# Patient Record
Sex: MALE | Race: WHITE | NOT HISPANIC OR LATINO | Employment: FULL TIME | URBAN - METROPOLITAN AREA
[De-identification: names, ages, dates, MRNs, and addresses within clinical notes are randomized per-mention and may not be internally consistent; named-entity substitution may affect disease eponyms.]

---

## 2017-01-18 ENCOUNTER — HOSPITAL ENCOUNTER (EMERGENCY)
Facility: HOSPITAL | Age: 32
Discharge: LEFT BEFORE DISCHARGE | End: 2017-01-18
Attending: EMERGENCY MEDICINE

## 2017-01-18 VITALS
TEMPERATURE: 98 F | WEIGHT: 158 LBS | SYSTOLIC BLOOD PRESSURE: 141 MMHG | RESPIRATION RATE: 18 BRPM | DIASTOLIC BLOOD PRESSURE: 84 MMHG | OXYGEN SATURATION: 98 % | HEIGHT: 71 IN | HEART RATE: 108 BPM | BODY MASS INDEX: 22.12 KG/M2

## 2017-01-18 DIAGNOSIS — R41.82 ALTERED MENTAL STATUS, UNSPECIFIED ALTERED MENTAL STATUS TYPE: Primary | ICD-10-CM

## 2017-01-18 PROCEDURE — 99283 EMERGENCY DEPT VISIT LOW MDM: CPT

## 2017-01-18 NOTE — ED PROVIDER NOTES
Encounter Date: 1/18/2017       History     Chief Complaint   Patient presents with    Drug Overdose     Pt initially found unresponsive by fire departement.  Pt was bagged two times by fire departement and became responsive.  Pinpoint pupils.  Ambulatory once EMS was on scene.  Per witness on scene, pt used IV heroin.     Review of patient's allergies indicates:  No Known Allergies  HPI Comments: Chief complaint: Altered mental status    History of present illness: 31-year-old male presents the emergency department after unresponsive episode.  Per EMS, arrived at the scene of the house where this patient another patient was unresponsive.  When patient was in respiratory and cardiac arrest and was brought here.  This patient required to bag valve ventilations and then woke up.  The patient currently denies having used any sedating medications or recreational drugs today.  No other complaints at this time.     No past medical history on file.  Past Medical History Pertinent Negatives   Diagnosis Date Noted    Asthma 7/27/2014    Depression 7/27/2014    Diabetes mellitus 7/27/2014    GERD (gastroesophageal reflux disease) 7/27/2014    Hypertension 7/27/2014     Past Surgical History   Procedure Laterality Date    Hernia repair       Family History   Problem Relation Age of Onset    No Known Problems Mother     No Known Problems Father      Social History   Substance Use Topics    Smoking status: Former Smoker    Smokeless tobacco: Never Used    Alcohol use Yes     Review of Systems   Constitutional: Negative for diaphoresis, fatigue and fever.   HENT: Negative for congestion.    Eyes: Negative for photophobia and visual disturbance.   Respiratory: Negative for cough, chest tightness, shortness of breath and wheezing.    Cardiovascular: Negative for chest pain and palpitations.   Gastrointestinal: Negative for abdominal distention, abdominal pain, constipation, diarrhea, nausea and vomiting.    Genitourinary: Negative for flank pain and frequency.   Musculoskeletal: Negative for arthralgias and myalgias.   Skin: Negative for color change, pallor, rash and wound.   Neurological: Negative for dizziness, weakness, light-headedness, numbness and headaches.   Psychiatric/Behavioral: Negative for agitation and behavioral problems.       Physical Exam   Initial Vitals   BP Pulse Resp Temp SpO2   01/18/17 1340 01/18/17 1340 01/18/17 1340 01/18/17 1340 01/18/17 1340   141/84 108 18 97.8 °F (36.6 °C) 98 %     Physical Exam    Constitutional: He appears well-developed and well-nourished. He is not diaphoretic. No distress.   HENT:   Head: Normocephalic.   Right Ear: External ear normal.   Left Ear: External ear normal.   Nose: Nose normal.   Mouth/Throat: Oropharynx is clear and moist.   Eyes: Conjunctivae and EOM are normal. Pupils are equal, round, and reactive to light. Right eye exhibits no discharge. Left eye exhibits no discharge. No scleral icterus.   Neck: Normal range of motion. Neck supple. No JVD present.   Cardiovascular: Normal rate, regular rhythm, normal heart sounds and intact distal pulses. Exam reveals no gallop and no friction rub.    No murmur heard.  Pulmonary/Chest: Breath sounds normal. No stridor. No respiratory distress. He has no wheezes. He has no rhonchi. He has no rales. He exhibits no tenderness.   Abdominal: He exhibits no distension and no mass. There is no tenderness. There is no rebound and no guarding.   Musculoskeletal: Normal range of motion. He exhibits no edema or tenderness.   Neurological: He is alert and oriented to person, place, and time. He has normal strength. No cranial nerve deficit or sensory deficit.   Skin: Skin is warm and dry. No rash noted. No erythema. No pallor.   Psychiatric: He has a normal mood and affect. His behavior is normal. Judgment and thought content normal.         ED Course   Procedures  Labs Reviewed - No data to display          Medical Decision  Making:   ED Management:  This is the emergent evaluation of a 31-year-old male resents emergency department after possible drug overdose.  Patient was in a house with another patient who was apneic and pulseless.  EMS attempted to wake up the patient and the patient does not wake up and therefore received 2 bag-valve-mask ventilations.  He then woke up with this stimulus.  Patient denies using any drugs today.  Given the above, and evaluation, workup, and observation.  Would be appropriate in the emergency department.  I explained this to the patient.  However, he is alert and oriented.  He is not confused.  He has a capacity to make the decision to leave which he would like to do now.  I cannot hold him against his will.  I believe he understands his current condition and my concerns.  I reviewed the AGAINST MEDICAL ADVICE form with him and advised him to return if he changes his mind and wants to be observed.  Risks of leaving include cardiac arrest, respiratory arrest, severe disability.                   ED Course     Clinical Impression:   Altered mental status          Yaron Chavez Jr., MD  01/18/17 7934

## 2017-01-18 NOTE — NURSING
ED PROVIDER HAS PROVIDED PT W/ AMA COUNSELING. PT STILL WISHES TO LEAVE AMA. PT REFUSES TO ANSWER INTAKE QUESTIONS AND DOES NOT WISH TO WAIT FOR DISCHARGE PAPERS. PT ADVISED TO CALL 911 OR RETURN TO ED IF HE EXPERIENCES ANY SOB, DIZZINESS, CP, OR OTHER SX THAT CONCERN HIM. PT VERBALIZES UNDERSTANDING. IV REMOVED.

## 2017-01-18 NOTE — ED NOTES
Requesting to go home, reports he was sleeping when awaken by mask on his face, reports last time he used Heroin yesterday morning, advise to please wait for md evaluation, to please remain calm, requesting water, per Dr. Barreto pt can have water, cup of  Water provided

## 2017-01-18 NOTE — ED TRIAGE NOTES
"PT TO ED 4 VIA EMS. PER EMS PT WAS UNRESPONSIVE WITH ANOTHER OD PATIENT AND WOKE UP WHILE BEING BAGGED W/ BVM. PT DENIES ANY DRUG USE AND STATES, "I WAS NATURALLY SLEEPING, I DON'T WANT TO STAY HERE." PT FULLY A/A/O, ED PROVIDER AWARE OF PT'S CC AND CURRENT STATE/WISHES.   "

## 2017-04-14 ENCOUNTER — HOSPITAL ENCOUNTER (EMERGENCY)
Facility: OTHER | Age: 32
Discharge: HOME | End: 2017-04-14
Attending: EMERGENCY MEDICINE

## 2017-04-14 VITALS
HEART RATE: 61 BPM | DIASTOLIC BLOOD PRESSURE: 76 MMHG | TEMPERATURE: 99 F | OXYGEN SATURATION: 100 % | SYSTOLIC BLOOD PRESSURE: 153 MMHG | WEIGHT: 180 LBS | HEIGHT: 71 IN | BODY MASS INDEX: 25.2 KG/M2 | RESPIRATION RATE: 18 BRPM

## 2017-04-14 DIAGNOSIS — Z48.02 ENCOUNTER FOR REMOVAL OF SUTURES: Primary | ICD-10-CM

## 2017-04-14 PROCEDURE — 99281 EMR DPT VST MAYX REQ PHY/QHP: CPT

## 2017-04-14 NOTE — DISCHARGE INSTRUCTIONS
"  Suture or Staple Removal  You were seen today for a suture or staple removal. Your wound is healing as expected. The wound has healed well enough that the sutures or staples can be removed. The wound will continue to heal for the next few months.  At this time there is no sign of infection.   Home care  · If you have pain, take pain medicine as advised by your healthcare provider.   · Keep your wound clean and protected by covering it with a bandage for the next week or so.   · Wash your hands with soap and warm water before and after caring for your wound. This helps prevent infection.  · Clean the wound gently with soap and warm water daily or as directed by your childs health care provider. Do not use iodine, alcohol, or other cleansers on the wound.  Gently pat it dry. Put on a new bandage, if needed. Do not reuse bandages.  · If the area gets wet, gently pat it dry with a clean cloth. Replace the wet bandage with a dry one.  · Check the wound daily for signs of infection. (These are listed under "When to seek medical advice" below.)  · You may shower and bathe as usual. Swimming is now permitted.  Follow-up care  Follow up with your healthcare provider as advised.  When to seek medical advice   Call your healthcare provider if any of the following occur:  · Wound reopens or bleeds  · Signs of an infection, such as:  ¨ Increasing redness or swelling around the wound  ¨ Increased warmth from the wound  ¨ Worsening pain  ¨ Red streaking lines away from the wound  ¨ Fluid draining from the wound  · Fever of 100.4°F (38°C) or higher, or as directed by your child's healthcare provider  Date Last Reviewed: 9/27/2015  © 2068-0538 [employer]. [address] [place] [address], [place] [address]. All rights reserved. This information is not intended as a substitute for professional medical care. Always follow your healthcare professional's instructions.        "

## 2017-04-14 NOTE — ED AVS SNAPSHOT
[facility]  [address]  [facility] [address]               Kevin MARIE  [place]   2017 10:48 AM   ED    Description:  Male : 1985   Department:  [facility], [place], operated [facility]           Your Care was Coordinated By:     Provider Role From To    Quiana Wallace MD Attending Provider 17 1051 --      Reason for Visit     Suture / Staple Removal           Diagnoses this Visit        Comments    Encounter for removal of sutures    -  Primary       ED Disposition     None           To Do List           Follow-up Information     Schedule an appointment as soon as possible for a visit with Your primary care doctor.    Why:  As needed      [facility]     [facility] Care [facility] -  Assistance  Unless otherwise directed by your provider, please contact [facility], our nurse care line that is available for  assistance.     Registered nurses in the [facility] provide: appointment scheduling, clinical advisement, health education, and other advisory services.  Call: [phone] (toll free)               Medications           Message regarding Medications     Verify the changes and/or additions to your medication regime listed below are the same as discussed with your clinician today.  If any of these changes or additions are incorrect, please notify your healthcare provider.             Verify that the below list of medications is an accurate representation of the medications you are currently taking.  If none reported, the list may be blank. If incorrect, please contact your healthcare provider. Carry this list with you in case of emergency.           Current Medications     acetaminophen (TYLENOL) 325 MG tablet Take 325 mg by mouth every 6 (six) hours as needed for Pain.    fish oil-omega-3 fatty acids 300-1,000 mg capsule Take 2 g by mouth once daily.    ibuprofen (ADVIL,MOTRIN) 800 MG tablet Take 1 tablet (800 mg total) by  "mouth every 6 (six) hours as needed for Pain.    multivitamin capsule Take 1 capsule by mouth once daily.           Clinical Reference Information           Your Vitals Were     BP Pulse Temp Resp Height Weight    153/76 (BP Location: Left arm, Patient Position: Sitting) 61 98.8 °F (37.1 °C) (Oral) 18 5' 11" (1.803 m) 81.6 kg (180 lb)    SpO2 BMI             100% 25.1 kg/m2         Allergies as of 4/14/2017     No Known Allergies      Immunizations Administered on Date of Encounter - 4/14/2017     None      ED Micro, Lab, POCT     None      ED Imaging Orders     None        Discharge Instructions         Suture or Staple Removal  You were seen today for a suture or staple removal. Your wound is healing as expected. The wound has healed well enough that the sutures or staples can be removed. The wound will continue to heal for the next few months.  At this time there is no sign of infection.   Home care  · If you have pain, take pain medicine as advised by your healthcare provider.   · Keep your wound clean and protected by covering it with a bandage for the next week or so.   · Wash your hands with soap and warm water before and after caring for your wound. This helps prevent infection.  · Clean the wound gently with soap and warm water daily or as directed by your childs health care provider. Do not use iodine, alcohol, or other cleansers on the wound.  Gently pat it dry. Put on a new bandage, if needed. Do not reuse bandages.  · If the area gets wet, gently pat it dry with a clean cloth. Replace the wet bandage with a dry one.  · Check the wound daily for signs of infection. (These are listed under "When to seek medical advice" below.)  · You may shower and bathe as usual. Swimming is now permitted.  Follow-up care  Follow up with your healthcare provider as advised.  When to seek medical advice   Call your healthcare provider if any of the following occur:  · Wound reopens or bleeds  · Signs of an infection, " such as:  ¨ Increasing redness or swelling around the wound  ¨ Increased warmth from the wound  ¨ Worsening pain  ¨ Red streaking lines away from the wound  ¨ Fluid draining from the wound  · Fever of 100.4°F (38°C) or higher, or as directed by your child's healthcare provider  Date Last Reviewed: 9/27/2015  © 9193-3105 [employer]. [address] [place] [address], [place] [address]. All rights reserved. This information is not intended as a substitute for professional medical care. Always follow your healthcare professional's instructions.          Smoking Cessation     If you would like to quit smoking:   You may be eligible for free services if you are a [place] resident and started smoking cigarettes before September 1, 1988.  Call the Smoking Cessation Trust ([facility]) toll free at (449) [phone] or (216) 550-3016.   Call 1-800-QUIT-NOW if you do not meet the above criteria.   Contact us via email: [email or web address]   View our website for more information: [email or web address]         [facility] complies with applicable Federal civil rights laws and does not discriminate on the basis of race, color, national origin, age, disability, or sex.        Language Assistance Services     ATTENTION: Language assistance services are available, free of charge. Please call [phone].      ATENCIÓN: Si habla español, tiene a stein disposición servicios gratuitos de asistencia lingüística. Llame al [phone].     CHÚ Ý: N?u b?n nói Ti?ng Vi?t, có các d?ch v? h? tr? ngôn ng? mi?n phí dành cho b?n. G?i s? [phone].

## 2017-06-26 ENCOUNTER — HOSPITAL ENCOUNTER (EMERGENCY)
Facility: HOSPITAL | Age: 32
Discharge: HOME | End: 2017-06-26

## 2017-06-26 VITALS
HEIGHT: 71 IN | DIASTOLIC BLOOD PRESSURE: 78 MMHG | WEIGHT: 180 LBS | OXYGEN SATURATION: 98 % | SYSTOLIC BLOOD PRESSURE: 119 MMHG | RESPIRATION RATE: 20 BRPM | BODY MASS INDEX: 25.2 KG/M2 | HEART RATE: 52 BPM | TEMPERATURE: 98 F

## 2017-06-26 DIAGNOSIS — H60.02 ABSCESS OF LEFT EXTERNAL EAR: Primary | ICD-10-CM

## 2017-06-26 PROCEDURE — 99283 EMERGENCY DEPT VISIT LOW MDM: CPT | Mod: 25

## 2017-06-26 PROCEDURE — 10060 I&D ABSCESS SIMPLE/SINGLE: CPT

## 2017-06-26 PROCEDURE — 25000003 PHARM REV CODE 250: Performed by: NURSE PRACTITIONER

## 2017-06-26 PROCEDURE — 25000003 PHARM REV CODE 250: Performed by: PHYSICIAN ASSISTANT

## 2017-06-26 RX ORDER — SULFAMETHOXAZOLE AND TRIMETHOPRIM 800; 160 MG/1; MG/1
1 TABLET ORAL
Status: COMPLETED | OUTPATIENT
Start: 2017-06-26 | End: 2017-06-26

## 2017-06-26 RX ORDER — TRAMADOL HYDROCHLORIDE 50 MG/1
50 TABLET ORAL EVERY 6 HOURS PRN
Qty: 8 TABLET | Refills: 0 | Status: SHIPPED | OUTPATIENT
Start: 2017-06-26 | End: 2022-02-14

## 2017-06-26 RX ORDER — LIDOCAINE HYDROCHLORIDE 10 MG/ML
10 INJECTION INFILTRATION; PERINEURAL
Status: COMPLETED | OUTPATIENT
Start: 2017-06-26 | End: 2017-06-26

## 2017-06-26 RX ORDER — SULFAMETHOXAZOLE AND TRIMETHOPRIM 800; 160 MG/1; MG/1
1 TABLET ORAL EVERY 12 HOURS
Qty: 14 TABLET | Refills: 0 | Status: SHIPPED | OUTPATIENT
Start: 2017-06-26 | End: 2017-07-03

## 2017-06-26 RX ADMIN — SULFAMETHOXAZOLE AND TRIMETHOPRIM 1 TABLET: 800; 160 TABLET ORAL at 10:06

## 2017-06-26 RX ADMIN — LIDOCAINE HYDROCHLORIDE 10 ML: 10 INJECTION, SOLUTION INFILTRATION; PERINEURAL at 10:06

## 2017-06-26 NOTE — ED PROVIDER NOTES
"Encounter Date: 6/26/2017       History     Chief Complaint   Patient presents with    Facial Swelling     left ear swelling x 1 wk; denies any trauma    Other     Historian: Patient  Chief complaint: Ear swelling  History of present illness: This 32 year old male presents to the ED complaining of a 10 day history of swelling with associated pain to his left external ear.  His pain is described as "pressure" and aggravated by touch.  The patient denies fever and ear drainage.  His tetanus immunization is up to date.          Review of patient's allergies indicates:  No Known Allergies  History reviewed. No pertinent past medical history.  Past Surgical History:   Procedure Laterality Date    HERNIA REPAIR       Family History   Problem Relation Age of Onset    No Known Problems Mother     No Known Problems Father      Social History   Substance Use Topics    Smoking status: Current Every Day Smoker    Smokeless tobacco: Never Used    Alcohol use Yes     Review of Systems   Constitutional: Negative for fever.   HENT:        +Left ear swelling.   Musculoskeletal: Negative for gait problem.   Skin: Negative for color change.   Allergic/Immunologic: Negative for immunocompromised state.   Neurological: Negative for seizures.   Psychiatric/Behavioral: Negative for confusion.       Physical Exam     Initial Vitals [06/26/17 0922]   BP Pulse Resp Temp SpO2   119/78 (!) 52 20 98 °F (36.7 °C) 98 %      MAP       91.67         Physical Exam    Constitutional: He appears well-developed and well-nourished. He is cooperative.  Non-toxic appearance. No distress.   HENT:   Head: Normocephalic and atraumatic.   Ears:    Mouth/Throat: Mucous membranes are normal. No trismus in the jaw.   Neck: Trachea normal, normal range of motion, full passive range of motion without pain and phonation normal. Neck supple. No stridor present. No neck rigidity.   Cardiovascular: Regular rhythm and normal heart sounds. Bradycardia present.  " Exam reveals no gallop.    Pulmonary/Chest: Effort normal and breath sounds normal. No tachypnea. No respiratory distress. He has no decreased breath sounds. He has no wheezes. He has no rhonchi. He has no rales.   Neurological: He is alert and oriented to person, place, and time. He has normal strength. No sensory deficit.   Skin: Skin is warm, dry and intact. No erythema.         ED Course   I & D - Incision and Drainage  Date/Time: 2017 10:44 AM  Performed by: RAHUL CHACON  Authorized by: KALYN RAMAN   Consent Done: Yes  Consent: Verbal consent obtained.  Risks and benefits: risks, benefits and alternatives were discussed  Consent given by: patient  Patient identity confirmed:  and name  Type: abscess  Location: left external ear.  Anesthesia: local infiltration    Anesthesia:  Local Anesthetic: lidocaine 1% without epinephrine  Anesthetic total: 0.5 mL  Patient sedated: no  Scalpel size: 11  Incision type: single straight  Complexity: simple  Drainage characteristics: +cloudy, serosanguinous.  Drainage amount: moderate  Wound treatment: incision and  wound left open  Complications: No  Patient tolerance: Patient tolerated the procedure well with no immediate complications        Labs Reviewed - No data to display             Additional MDM:   Comments: Patient with a 10 day history of left external ear swelling.  He is afebrile and nontoxic appearing.  He has a 3 cm area of swelling, fluctuance, and tenderness to palpation to his left auricle.  This was incised and drained as per the procedure note.  As the drainage was cloudy and serosanguinous, will treat with antibiotics - first dose given in the ED.  His tetanus immunization is up to date.  He will be discharged to home with supportive care and Ultram for pain.  He is to return to the ED in 2 days for wound check.  Careful ED warnings and return instructions given.  This patient's case was discussed with Dr. Raman, who also evaluated  the patient - he is in agreement with the assessment and plan..            Attending Attestation:     Physician Attestation Statement for NP/PA:   I have conducted a face to face encounter with this patient in addition to the NP/PA, due to Medical Complexity    Other NP/PA Attestation Additions:    History of Present Illness: No previous history of vitals are abscesses,   Physical Exam: 1.5 x 1.0 cm abscess left ear                  ED Course     Clinical Impression:   The encounter diagnosis was Abscess of left external ear.                           TRINY Martinez  06/26/17 1140

## 2017-06-26 NOTE — DISCHARGE INSTRUCTIONS
The patient is discharged to home.  You are to follow up as directed above.  Keep wound clean and dry.  Change dressing at least twice a day or more if needed.  Return to the ED for any new or worsening symptoms: fever, increased pain/swelling, or any other concerns.

## 2017-06-28 ENCOUNTER — HOSPITAL ENCOUNTER (EMERGENCY)
Facility: HOSPITAL | Age: 32
Discharge: HOME | End: 2017-06-28
Attending: EMERGENCY MEDICINE

## 2017-06-28 VITALS
RESPIRATION RATE: 18 BRPM | BODY MASS INDEX: 25.2 KG/M2 | TEMPERATURE: 99 F | WEIGHT: 180 LBS | SYSTOLIC BLOOD PRESSURE: 134 MMHG | DIASTOLIC BLOOD PRESSURE: 78 MMHG | OXYGEN SATURATION: 97 % | HEART RATE: 60 BPM | HEIGHT: 71 IN

## 2017-06-28 DIAGNOSIS — S00.432A EAR HEMATOMA, LEFT, INITIAL ENCOUNTER: Primary | ICD-10-CM

## 2017-06-28 PROCEDURE — 25000003 PHARM REV CODE 250: Performed by: NURSE PRACTITIONER

## 2017-06-28 PROCEDURE — 10060 I&D ABSCESS SIMPLE/SINGLE: CPT

## 2017-06-28 PROCEDURE — 99283 EMERGENCY DEPT VISIT LOW MDM: CPT | Mod: 25

## 2017-06-28 PROCEDURE — 69000 DRG XTRNL EAR ABSC/HEM SMPL: CPT | Mod: LT

## 2017-06-28 RX ORDER — LIDOCAINE HYDROCHLORIDE 10 MG/ML
10 INJECTION INFILTRATION; PERINEURAL
Status: COMPLETED | OUTPATIENT
Start: 2017-06-28 | End: 2017-06-28

## 2017-06-28 RX ADMIN — LIDOCAINE HYDROCHLORIDE 10 ML: 10 INJECTION, SOLUTION INFILTRATION; PERINEURAL at 08:06

## 2017-06-28 NOTE — DISCHARGE INSTRUCTIONS
Please try to keep the pressure dressing over the ear for as long as possible to prevent it from re-filling with blood.    Keep taking the bactrim until the prescription has finished.    You can take Ibuprofen for pain and inflammation.  Take the tramadol that was previously prescribed for breakthrough pain, as needed.    Follow up with ENT for further evaluation of the ear, if any persisting problems.    Return to the ER for any new or worsening symptoms or concerns.

## 2017-06-28 NOTE — ED TRIAGE NOTES
Here 2 days ago with abscess to lt. Ear. Here today c/o  Increased swelling to ear. Also needs work note to RTW.

## 2017-06-28 NOTE — ED PROVIDER NOTES
"Encounter Date: 6/28/2017    SCRIBE #1 NOTE: I, Morales Shea, am scribing for, and in the presence of,  Priya Robison NP. I have scribed the following portions of the note - Other sections scribed: HPI and ROS.       History     Chief Complaint   Patient presents with    Abscess     Pt reports he was here on 6/26 for left pinnea abscess and they I & D'd the ear. Today "it is swollen and red again and I thinks it may need to be popped again." No fevers reported.     Chief Complaint: Abscess    HPI: This 32 y.o. Male with no known PMHx presents to the ED c/o an abscess to the left ear. Patient received an I&D 2 days ago and told to return to this ED if symptoms worsened. Patient states pain is severe and has worsened since onset. He also states the wound has since "refilled". He also compliant with Bactrim treatment. He denies fever, chills, nausea, vomiting or hearing decrease.       The history is provided by the patient. No  was used.     Review of patient's allergies indicates:  No Known Allergies  History reviewed. No pertinent past medical history.  Past Surgical History:   Procedure Laterality Date    HERNIA REPAIR       Family History   Problem Relation Age of Onset    No Known Problems Mother     No Known Problems Father      Social History   Substance Use Topics    Smoking status: Current Every Day Smoker    Smokeless tobacco: Never Used    Alcohol use Yes     Review of Systems   Constitutional: Negative for chills and fever.   HENT: Negative for ear pain.    Respiratory: Negative for cough.    Gastrointestinal: Negative for nausea and vomiting.   Skin: Positive for wound.   Neurological: Negative for headaches.       Physical Exam     Initial Vitals [06/28/17 0731]   BP Pulse Resp Temp SpO2   126/70 74 18 98.1 °F (36.7 °C) 98 %      MAP       88.67         Physical Exam    Nursing note and vitals reviewed.  Constitutional: Vital signs are normal. He appears well-developed and " well-nourished. He is not diaphoretic. He is active and cooperative.  Non-toxic appearance. He does not appear ill. No distress.   HENT:   Right Ear: Tympanic membrane, external ear and ear canal normal.   Left Ear: Tympanic membrane and ear canal normal.   Ears:    Neurological: He is alert and oriented to person, place, and time.   Skin: Skin is warm and dry. Capillary refill takes less than 2 seconds.         ED Course   I & D - Incision and Drainage  Date/Time: 6/28/2017 8:45 AM  Performed by: FRANKIE PICKENS  Authorized by: BENOIT AGUILAR JR   Type: cyst  Body area: head/neck  Location details: left external ear  Anesthesia: local infiltration    Anesthesia:  Local Anesthetic: lidocaine 1% without epinephrine  Anesthetic total: 1 mL  Scalpel size: 11  Incision type: single straight  Complexity: simple  Drainage: bloody  Drainage amount: scant  Wound treatment: incision and  drainage  Packing material: none  Complications: No  Specimens: No  Implants: No  Patient tolerance: Patient tolerated the procedure well with no immediate complications        Labs Reviewed - No data to display             Additional MDM:   Comments: This is an urgent evaluation of a 32-year-old male that presents the emergency room complaining of left otalgia.  He was seen in emergency room 2 days ago for an abscess to the left ear that was incised and drained.  He was placed on Bactrim, but reports that shortly after taking off the pressure bandage that was applied and the emergency room, the wound began to refill and has become painful again.  He denies any other symptoms; no diminished hearing, and inner pain, ear drainage, fevers, body aches.  A stab incision was performed and a small amount of blood drained from the wound.  There was no purulence.  This is essentially an auricular hematoma.  I recommended the patient continue taking Bactrim until was completed.  A pressure dressing was placed over the ear and I advised the patient to  wear the dressing as long as possible today.  ENT referral was given for any persisting problems.  Return precautions given for any new or worsening symptoms or concerns.  Case discussed with attending, , and he is in agreement with plan. Stable for discharge and outpatient follow.  .          Scribe Attestation:   Scribe #1: I performed the above scribed service and the documentation accurately describes the services I performed. I attest to the accuracy of the note.    Attending Attestation:     Physician Attestation Statement for NP/PA:   I discussed this assessment and plan of this patient with the NP/PA, but I did not personally examine the patient. The face to face encounter was performed by the NP/PA.    Other NP/PA Attestation Additions:      Medical Decision Making: This is the emergent evaluation of a 32-year-old male presents the emergency department for reevaluation of abscess to the left ear.  Patient was seen at an I and D done.  Is placed on Bactrim.  He is taking this.I agree with the treatment plan and evaluation as outlined by the midlevel provider.  Abscess was reopened for possible further drainage by mid-level provider.  This revealed only blood no evidence of purulent drainage.  There is no evidence of surrounding cellulitis.  Patient to continue on Bactrim and to wear pressure bandage which he was not wearing.  He also will be referred to your nose and throat if symptoms do not improve.  He was advised return for new or worsening symptoms such as worsening swelling, fever, headaches.       Physician Attestation for Scribe:  Physician Attestation Statement for Scribe #1: I, Priya Robison NP, reviewed documentation, as scribed by Morales Shea in my presence, and it is both accurate and complete.                 ED Course     Clinical Impression:   The encounter diagnosis was Ear hematoma, left, initial encounter.    Disposition:   Disposition: Discharged  Condition:  Stable                        rPiya Robison, [place]  06/28/17 8886

## 2018-01-15 ENCOUNTER — HOSPITAL ENCOUNTER (EMERGENCY)
Facility: OTHER | Age: 33
Discharge: HOME | End: 2018-01-15
Attending: EMERGENCY MEDICINE

## 2018-01-15 VITALS
WEIGHT: 180 LBS | OXYGEN SATURATION: 100 % | RESPIRATION RATE: 16 BRPM | TEMPERATURE: 98 F | BODY MASS INDEX: 24.38 KG/M2 | DIASTOLIC BLOOD PRESSURE: 64 MMHG | HEART RATE: 62 BPM | HEIGHT: 72 IN | SYSTOLIC BLOOD PRESSURE: 122 MMHG

## 2018-01-15 DIAGNOSIS — S97.81XA: Primary | ICD-10-CM

## 2018-01-15 DIAGNOSIS — S99.921A RIGHT FOOT INJURY: ICD-10-CM

## 2018-01-15 PROCEDURE — 99283 EMERGENCY DEPT VISIT LOW MDM: CPT

## 2018-01-15 RX ORDER — IBUPROFEN 800 MG/1
800 TABLET ORAL 3 TIMES DAILY
COMMUNITY
End: 2022-11-26

## 2018-01-15 NOTE — ED TRIAGE NOTES
""heavy metal eye beam fell onto my right foot yesterday". C/o pain to dorsal surface of r fight. + edema. No visible deformity. Took motrin this am   "

## 2018-01-31 NOTE — ED PROVIDER NOTES
Encounter Date: 1/15/2018       History     Chief Complaint   Patient presents with    Foot Injury     Mr Lindo reports pain to top of R foot. Reports accidental injury yesterday when I beam fell on top of foot. Went to work today and reports was told to get foot evaluated when noticed he was limping. Reports pain worsens w ambulation/bearing weight. No ankle pain or other leg pain. No tingling or numbness. Was wearing shoes. Reports mild to mod pain to top of R foot. Mild relief w otc nsaids.       The history is provided by the patient.     Review of patient's allergies indicates:  No Known Allergies  Past Medical History:   Diagnosis Date    H/O hernia repair      Past Surgical History:   Procedure Laterality Date    HERNIA REPAIR       Family History   Problem Relation Age of Onset    No Known Problems Mother     No Known Problems Father      Social History   Substance Use Topics    Smoking status: Current Every Day Smoker    Smokeless tobacco: Never Used    Alcohol use Yes     Review of Systems   Musculoskeletal:        Pain to top of R foot, injury   All other systems reviewed and are negative.      Physical Exam     Initial Vitals [01/15/18 1531]   BP Pulse Resp Temp SpO2   130/76 66 15 98 °F (36.7 °C) 99 %      MAP       94         Physical Exam    Nursing note and vitals reviewed.  Constitutional: He appears well-developed and well-nourished. He is not diaphoretic. No distress.   HENT:   Head: Normocephalic and atraumatic.   Neck: Normal range of motion.   Pulmonary/Chest: No respiratory distress.   Musculoskeletal:   Mild Localized swelling to dorsum of R mid foot w no deformity or perfusion problesm. Normal rom of toes and ankle. Normal 2+dp pulse.    Neurological: He is alert and oriented to person, place, and time. No cranial nerve deficit or sensory deficit.   Skin: Skin is warm and dry. Capillary refill takes less than 2 seconds. No rash noted. No erythema.   Psychiatric: He has a normal  mood and affect. His behavior is normal. Thought content normal.         ED Course   Procedures  Labs Reviewed - No data to display          Medical Decision Making:   Clinical Tests:   Radiological Study: Ordered and Reviewed  ED Management:  Foot xray negative for fx but explained that underlying fx not seen may be possible. Discussed need to have repeat xrays in a few days if pain persists and discussed worrisome signs that should prompt need to return to er should they occur. Walking boot given after discussion. There is no indication for further emergent intervention or evaluation at this time.                      ED Course      Clinical Impression:   The primary encounter diagnosis was Crush injury to foot, right, initial encounter. A diagnosis of Right foot injury was also pertinent to this visit.                           Kadeem Freitas MD  01/31/18 0013       Kadeem Freitas MD  01/31/18 0014

## 2019-06-06 ENCOUNTER — HOSPITAL ENCOUNTER (EMERGENCY)
Facility: HOSPITAL | Age: 34
Discharge: HOME | End: 2019-06-06
Attending: EMERGENCY MEDICINE

## 2019-06-06 VITALS
SYSTOLIC BLOOD PRESSURE: 148 MMHG | OXYGEN SATURATION: 97 % | HEART RATE: 72 BPM | TEMPERATURE: 98 F | WEIGHT: 185 LBS | BODY MASS INDEX: 22.53 KG/M2 | DIASTOLIC BLOOD PRESSURE: 97 MMHG | HEIGHT: 76 IN | RESPIRATION RATE: 18 BRPM

## 2019-06-06 DIAGNOSIS — H18.891 CORNEAL RUST RING OF RIGHT EYE: Primary | ICD-10-CM

## 2019-06-06 DIAGNOSIS — T15.91XA FOREIGN BODY OF RIGHT EYE, INITIAL ENCOUNTER: ICD-10-CM

## 2019-06-06 PROCEDURE — 90471 IMMUNIZATION ADMIN: CPT | Performed by: PHYSICIAN ASSISTANT

## 2019-06-06 PROCEDURE — 25000003 PHARM REV CODE 250: Performed by: PHYSICIAN ASSISTANT

## 2019-06-06 PROCEDURE — 90715 TDAP VACCINE 7 YRS/> IM: CPT | Performed by: PHYSICIAN ASSISTANT

## 2019-06-06 PROCEDURE — 99284 EMERGENCY DEPT VISIT MOD MDM: CPT

## 2019-06-06 PROCEDURE — 99284 PR EMERGENCY DEPT VISIT,LEVEL IV: ICD-10-PCS | Mod: ,,, | Performed by: PHYSICIAN ASSISTANT

## 2019-06-06 PROCEDURE — 99284 EMERGENCY DEPT VISIT MOD MDM: CPT | Mod: ,,, | Performed by: PHYSICIAN ASSISTANT

## 2019-06-06 PROCEDURE — 63600175 PHARM REV CODE 636 W HCPCS: Performed by: PHYSICIAN ASSISTANT

## 2019-06-06 RX ORDER — IBUPROFEN 400 MG/1
800 TABLET ORAL
Status: COMPLETED | OUTPATIENT
Start: 2019-06-06 | End: 2019-06-06

## 2019-06-06 RX ORDER — MOXIFLOXACIN 5 MG/ML
1 SOLUTION/ DROPS OPHTHALMIC EVERY 6 HOURS
Qty: 1.3334 ML | Refills: 0 | Status: SHIPPED | OUTPATIENT
Start: 2019-06-06 | End: 2019-06-11

## 2019-06-06 RX ORDER — ERYTHROMYCIN 5 MG/G
OINTMENT OPHTHALMIC
Qty: 1 TUBE | Refills: 0 | Status: SHIPPED | OUTPATIENT
Start: 2019-06-06 | End: 2022-02-14

## 2019-06-06 RX ORDER — PROPARACAINE HYDROCHLORIDE 5 MG/ML
1 SOLUTION/ DROPS OPHTHALMIC
Status: COMPLETED | OUTPATIENT
Start: 2019-06-06 | End: 2019-06-06

## 2019-06-06 RX ADMIN — IBUPROFEN 800 MG: 400 TABLET, FILM COATED ORAL at 10:06

## 2019-06-06 RX ADMIN — PROPARACAINE HYDROCHLORIDE 1 DROP: 5 SOLUTION/ DROPS OPHTHALMIC at 08:06

## 2019-06-06 RX ADMIN — CLOSTRIDIUM TETANI TOXOID ANTIGEN (FORMALDEHYDE INACTIVATED), CORYNEBACTERIUM DIPHTHERIAE TOXOID ANTIGEN (FORMALDEHYDE INACTIVATED), BORDETELLA PERTUSSIS TOXOID ANTIGEN (GLUTARALDEHYDE INACTIVATED), BORDETELLA PERTUSSIS FILAMENTOUS HEMAGGLUTININ ANTIGEN (FORMALDEHYDE INACTIVATED), BORDETELLA PERTUSSIS PERTACTIN ANTIGEN, AND BORDETELLA PERTUSSIS FIMBRIAE 2/3 ANTIGEN 0.5 ML: 5; 2; 2.5; 5; 3; 5 INJECTION, SUSPENSION INTRAMUSCULAR at 10:06

## 2019-06-07 ENCOUNTER — OFFICE VISIT (OUTPATIENT)
Dept: OPHTHALMOLOGY | Facility: CLINIC | Age: 34
End: 2019-06-07
Payer: COMMERCIAL

## 2019-06-07 DIAGNOSIS — H20.9 IRITIS OF RIGHT EYE: ICD-10-CM

## 2019-06-07 DIAGNOSIS — S05.01XA ABRASION OF RIGHT CORNEA, INITIAL ENCOUNTER: Primary | ICD-10-CM

## 2019-06-07 PROCEDURE — 92012 PR EYE EXAM, EST PATIENT,INTERMED: ICD-10-PCS | Mod: S$GLB,,, | Performed by: OPHTHALMOLOGY

## 2019-06-07 PROCEDURE — 92012 INTRM OPH EXAM EST PATIENT: CPT | Mod: S$GLB,,, | Performed by: OPHTHALMOLOGY

## 2019-06-07 PROCEDURE — 99999 PR PBB SHADOW E&M-EST. PATIENT-LVL II: CPT | Mod: PBBFAC,,, | Performed by: OPHTHALMOLOGY

## 2019-06-07 PROCEDURE — 99999 PR PBB SHADOW E&M-EST. PATIENT-LVL II: ICD-10-PCS | Mod: PBBFAC,,, | Performed by: OPHTHALMOLOGY

## 2019-06-07 NOTE — CONSULTS
[facility]  Ophthalmology  Consult Note    Patient Name: Kevin Lindo  MRN: [id number]  Admission Date: 6/6/2019  Hospital Length of Stay: 0 days  Attending Provider: Catherine Myers MD   Primary Care Physician: Primary Doctor No  Principal Problem:<principal problem not specified>    Inpatient consult to Ophthalmology  Consult performed by: Daniel Kendall MD  Consult ordered by: Miguel Rivera PA-C        Subjective:     Chief Complaint:  Corneal foreign body right eye    HPI:    Kevin Lindo is an 34 y.o. male with no significant PMHx who presents with right eye metallic FB. 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 Progressive blurriness of vision, assoc with monocular diplopia right eye, eye pain, tearing, eyelid swelling. Denies flashes/floaters.    POHx: denies glasses, contacts, surgeries, injections, lasers, trauma, gtts     FOHx: denies ARMD. Glaucoma in grandmother.      Base Eye Exam     Visual Acuity (Snellen - Linear)       Right Left    Near sc 20/30 20/25          Tonometry (Tonopen, 10:21 PM)       Right Left    Pressure 11 12          Pupils       Pupils Dark Light Shape React APD    Right PERRL 3 2 Round Brisk None    Left PERRL 3 2 Round Brisk None          Visual Fields       Right Left     Full Full          Extraocular Movement       Right Left     Full, Ortho Full, Ortho          Dilation     Both eyes:  2.5% phenylephrine, 1% tropicamide @ 10:21 PM            Slit Lamp and Fundus Exam     External Exam       Right Left    External Normal Normal          Slit Lamp Exam       Right Left    Lids/Lashes Normal Normal    Conjunctiva/Sclera 1+ Injection White and quiet    Cornea 0.5mm rust ring at 3:00 3mm from limbus Clear    Anterior Chamber Deep and quiet Deep and quiet    Iris Round and reactive Round and reactive    Lens Clear Clear    Vitreous Normal Normal          Fundus Exam       Right Left    Disc Normal Normal     C/D Ratio 0.2 0.2    Macula Normal Normal    Vessels Normal Normal    Periphery Normal Normal              Assessment and Plan:     1. Corneal foreign body, right eye  - at 3:00 outside visual axis  - no signs of globe penetration  - 0.5mm diameter ring, removed at slit lamp with 18G needle   - Vigamox instilled before and after, instilled EES missael after  - no complications  - small residual amount of faint residual rust ring left as ring was fairly deep with initial injury occurring 3d ago    Recommendations:  - start erythromycin ointment qHS right eye  - start vigamox 6x daily right eye  - will see in ophthalmology clinic tomorrow     Pt phone: [phone]    Daniel Kendall MD  Ophthalmology  [facility]

## 2019-06-07 NOTE — ED TRIAGE NOTES
Pt states that he is a  and yesterday morning he thinks he got a piece of metal in his right eye. Pt states it has been getting worse since yesterday and now eye is swollen and irritated. Pt states that his vision in his right eye is blurry at present.

## 2019-06-07 NOTE — ED PROVIDER NOTES
Encounter Date: 6/6/2019    SCRIBE #1 NOTE: I, Kenny Encarnacion, am scribing for, and in the presence of,  Dr. Myers. I have scribed the following portions of the note - the APC attestation.       History     Chief Complaint   Patient presents with    Eye Problem     yesterday AM believes metal got in R eye at work. c/o blurry vision, tearing, pain.      34 year old male previously healthy presenting to the ED with the chief complaint of right eye pain. 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 He reports progressively worsening pain and eyelid swelling over the past 2 days. He reports associated clear discharge and blurry vision. He denies left eye involvement. He denies history of eye surgeries or eye problems. He denies contact lens and eyeglasses use.         Review of patient's allergies indicates:  No Known Allergies  Past Medical History:   Diagnosis Date    H/O hernia repair      Past Surgical History:   Procedure Laterality Date    HERNIA REPAIR       Family History   Problem Relation Age of Onset    No Known Problems Mother     No Known Problems Father      Social History     Tobacco Use    Smoking status: Former Smoker    Smokeless tobacco: Never Used   Substance Use Topics    Alcohol use: Yes    Drug use: Not Currently     Types: Marijuana     Review of Systems   Constitutional: Negative for chills, diaphoresis and fever.   HENT: Negative for sore throat and trouble swallowing.    Eyes: Positive for pain, discharge (clear), redness and visual disturbance. Negative for photophobia.   Respiratory: Negative for cough and shortness of breath.    Cardiovascular: Negative for chest pain.   Gastrointestinal: Negative for abdominal pain, diarrhea, nausea and vomiting.   Genitourinary: Negative for dysuria and hematuria.   Musculoskeletal: Negative for arthralgias, back pain, neck pain and  neck stiffness.   Skin: Negative for rash and wound.   Neurological: Negative for dizziness, light-headedness and headaches.     Physical Exam     Initial Vitals [06/06/19 1858]   BP Pulse Resp Temp SpO2   (!) 165/94 84 18 98.5 °F (36.9 °C) 97 %      MAP       --         Physical Exam    Constitutional: He appears well-developed and well-nourished. He is not diaphoretic. No distress.   HENT:   Head: Normocephalic and atraumatic.   Mouth/Throat: Oropharynx is clear and moist. No oropharyngeal exudate.   Eyes: EOM are normal. Pupils are equal, round, and reactive to light. Right eye exhibits discharge (clear).   Right eye - Mild edema to upper and lower eyelids. Conjunctiva injected. Clear discharge  Small 0.5 mm circular fluorescin    Neck: Normal range of motion. Neck supple.   Cardiovascular: Normal rate and regular rhythm.   Pulmonary/Chest: Breath sounds normal. No respiratory distress. He has no wheezes.   Abdominal: Soft. He exhibits no distension. There is no tenderness.   Musculoskeletal: Normal range of motion. He exhibits no tenderness.   Neurological: He is alert and oriented to person, place, and time.     Right eye:        ED Course   Procedures  Labs Reviewed - No data to display       Imaging Results    None          Medical Decision Making:   History:   Old Medical Records: I decided to obtain old medical records.  Other:   I have discussed this case with another health care provider.       <> Summary of the Discussion: Ophthalmology           APC / Resident Notes:   34 year old male previously healthy presenting to the ED c/o right eye pain. 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 DDx includes but not limited to foreign body presence, corneal abrasion, globe injury, conjunctivitis, uveitis. Will update tetanus.    9:16 PM  Partial removal of foreign body in R eye. Will discuss with patient with Ophthalmology and they will come see the patient.  Consult placed.     Patient evaluated by ophthalmology at bedside. They were able to remove metallic object and rust ring at bedside. Patient stable for discharge. RX for Vigamox gtts and Azithromycin ointment given. Patient will follow-up in eye clinic tomorrow. Patient expresses understanding and agreeable to the plan. Return to ED precautions given for new, worsening, or concerning symptoms. I have discussed the care of this patient with my supervising physician.        Scribe Attestation:   Scribe #1: I performed the above scribed service and the documentation accurately describes the services I performed. I attest to the accuracy of the note.    Attending Attestation:     Physician Attestation Statement for NP/PA:   I have conducted a face to face encounter with this patient in addition to the NP/PA, due to Medical Complexity    Other NP/PA Attestation Additions:    History of Present Illness: Patient with a foreign body in right eye. Assisted PA in foreign body removal. Was successful to remove metallic object. Otpthamology consulted to remove restring. Resident wants to discharge patient on Vigamox and arythromyacin oitment. Will follow up with opthamology clinic tomorrow.                       Clinical Impression:       ICD-10-CM ICD-9-CM   1. Corneal rust ring of right eye H18.891 371.89     908.5   2. Foreign body of right eye, initial encounter T15.91XA 930.9     E914         Disposition:   Disposition: Discharged  Condition: Stable                        Miguel Rivera PA-C  06/07/19 0121

## 2019-06-07 NOTE — PROGRESS NOTES
Subjective:       Patient ID: Kevin Lindo is a 34 y.o. male.    Chief Complaint: Eye Problem    HPI     DSL- 6/7/19 Dr. Kendall    35 y/o male is here for ED follow-up for FB w/rust ring OD. Pt states pain   has subsided this morning but OD is still red and  irritated. Va is   slightly blurry. Pt was given Viagmox- pt picked drops this morning.     Eyemeds  Vigamox OD     Last edited by Gaye Herring on 6/7/2019 11:00 AM. (History)             Assessment:       1. Abrasion of right cornea, initial encounter    2. Iritis of right eye        Plan:       Residual K abrasion & iritis OD s/p rust ring removal yesterday-Stable.      Start Vigamox qid OD & EES missael qhs-bid OD.  RTC me on 6/10/19 at [facility].

## 2019-06-19 ENCOUNTER — HOSPITAL ENCOUNTER (EMERGENCY)
Facility: HOSPITAL | Age: 34
Discharge: HOME | End: 2019-06-20
Attending: EMERGENCY MEDICINE

## 2019-06-19 DIAGNOSIS — T50.901A ACCIDENTAL DRUG OVERDOSE, INITIAL ENCOUNTER: Primary | ICD-10-CM

## 2019-06-19 PROCEDURE — 99282 EMERGENCY DEPT VISIT SF MDM: CPT

## 2019-06-20 VITALS
BODY MASS INDEX: 24.38 KG/M2 | SYSTOLIC BLOOD PRESSURE: 145 MMHG | WEIGHT: 180 LBS | HEIGHT: 72 IN | TEMPERATURE: 99 F | DIASTOLIC BLOOD PRESSURE: 78 MMHG | HEART RATE: 102 BPM | OXYGEN SATURATION: 98 % | RESPIRATION RATE: 16 BRPM

## 2019-06-20 NOTE — ED PROVIDER NOTES
Encounter Date: 6/19/2019    SCRIBE #1 NOTE: I, Kandijoe Scales, am scribing for, and in the presence of,  Fermin Kaur MD. I have scribed the following portions of the note - Other sections scribed: HPI, ROS, PE.       History     Chief Complaint   Patient presents with    Drug Overdose     via EMS who report [facility] received call that pt was found down and unresponsive by an onlooker by his car. Upon EMS arrival, pt awake and alert with slurred speech, pinpoint pupils. Denies any drug use. No narcan administered     34 y.o male presents to ED via EMS with c/o accidental drug overdose after taking xanax (0.25) while drinking 2.5 beers (16 oz) of andrade light 4 hours ago. Pt does not remember overdosing just waking up with EMS present. Pt denies HA, SOB, chest pain, abdominal pain, or chills. Pt reports taking multivitamin and fish oils. No PMHx. Pt denies Hx of smoking or drugs including heroin, pain killers, or IV use. Pt reports social Hx of . Pt reports drinking daily. NKDA.     The history is provided by the patient.     Review of patient's allergies indicates:  No Known Allergies  Past Medical History:   Diagnosis Date    H/O hernia repair      Past Surgical History:   Procedure Laterality Date    HERNIA REPAIR       Family History   Problem Relation Age of Onset    No Known Problems Mother     No Known Problems Father      Social History     Tobacco Use    Smoking status: Former Smoker    Smokeless tobacco: Never Used   Substance Use Topics    Alcohol use: Yes    Drug use: Not Currently     Types: Marijuana     Review of Systems   Constitutional: Negative for chills and fever.   HENT: Negative for rhinorrhea.    Eyes: Negative for discharge.   Respiratory: Negative for shortness of breath.    Cardiovascular: Negative for chest pain.   Gastrointestinal: Negative for abdominal pain.   Genitourinary: Negative for dysuria.   Musculoskeletal: Negative for neck pain.   Skin: Negative for  color change.   Neurological: Negative for headaches.   Psychiatric/Behavioral: Negative for confusion.       Physical Exam     Initial Vitals [06/19/19 2322]   BP Pulse Resp Temp SpO2   (!) 170/90 104 16 98.5 °F (36.9 °C) 98 %      MAP       --         Physical Exam    Nursing note and vitals reviewed.  Constitutional: He appears well-developed and well-nourished. He is not diaphoretic. No distress.   HENT:   Head: Normocephalic and atraumatic.   Mouth/Throat: Oropharynx is clear and moist.   Eyes: Conjunctivae and EOM are normal. Pupils are equal, round, and reactive to light. No scleral icterus.   Neck: Normal range of motion. Neck supple. No JVD present.   Cardiovascular: Normal rate, regular rhythm, normal heart sounds and intact distal pulses. Exam reveals no gallop and no friction rub.    No murmur heard.  Pulmonary/Chest: Breath sounds normal. No stridor. No respiratory distress. He has no wheezes. He has no rhonchi. He has no rales.   Abdominal: Soft. Bowel sounds are normal. He exhibits no distension. There is no tenderness. There is no rebound and no guarding.   Musculoskeletal: Normal range of motion. He exhibits no edema or tenderness.   Neurological: He is alert and oriented to person, place, and time. He has normal strength. No cranial nerve deficit or sensory deficit.   Skin: Skin is warm and dry. No rash noted.   Psychiatric: His behavior is normal. His affect is blunt (very mild).   Not somnolent or falling asleep.         ED Course   Procedures  Labs Reviewed - No data to display       Imaging Results    None          Medical Decision Making:   Initial Assessment:   34-year-old male with history of drug abuse and overdose presenting after accidental overdose.  Patient only reports drinking 2 beers and taking 0.25 mg of Xanax.  Review of chart reveals significant drug overdose after admitting to using heroin 2 years ago.  Discussed same with patient who admits he was using heroin tonight.  Patient  not somnolent, awake, alert, did not require Narcan.  He is observed in the emergency department for least 30 min without evidence of worsening somnolence.  Patient denies any suicidal intentions.  Had an in-depth grave discussion with the patient regarding his near death experience, grave prognosis, an absolute necessity to get help for drug addiction.  Patient reports he will do the same.  Patient was given resources.  Otherwise safe for discharge home with a normal exam and no further complaints.                      Clinical Impression:       ICD-10-CM ICD-9-CM   1. Accidental drug overdose, initial encounter T50.901A 977.9     E858.9         Disposition:   Disposition: Discharged  Condition: Stable         Scribe attestation: I, Fermin Kaur  , personally performed the services described in this documentation. All medical record entries made by the scribe were at my direction and in my presence.  I have reviewed the chart and agree that the record reflects my personal performance and is accurate and complete.                 Fermin Kaur MD  06/20/19 0228

## 2019-06-20 NOTE — DISCHARGE INSTRUCTIONS
You were seen in the emergency department for an overdose.  You nearly .    This is your wakeup call.  Stop using drugs and alcohol.  Please get help and find a rehabilitation program that works for you.  Please return for any new or worsening concerns, thoughts of hurting yourself or others, feelings of hopelessness, or you become concerned in any other way.

## 2019-06-20 NOTE — ED TRIAGE NOTES
"Pt presents to ER via EMS with c/o drug overdose. EMS reports [facility] received call that pt was found down and unresponsive by an onlooker by his car. Upon EMS arrival, pt awake and alert with slurred speech, pinpoint pupils. Denies any drug use to EMS. No narcan administered. Pt admits to drinking andrade lite today as well as taking "a piece" of xanax, then he has no recollection of anything otherwise. Pt AAOx4. Denies CP, SOB, N/V.   "

## 2020-04-08 ENCOUNTER — HOSPITAL ENCOUNTER (EMERGENCY)
Facility: HOSPITAL | Age: 35
Discharge: HOME | End: 2020-04-08
Attending: EMERGENCY MEDICINE
Payer: OTHER GOVERNMENT

## 2020-04-08 VITALS
RESPIRATION RATE: 20 BRPM | BODY MASS INDEX: 25.2 KG/M2 | WEIGHT: 180 LBS | HEIGHT: 71 IN | SYSTOLIC BLOOD PRESSURE: 132 MMHG | DIASTOLIC BLOOD PRESSURE: 83 MMHG | TEMPERATURE: 98 F | HEART RATE: 87 BPM | OXYGEN SATURATION: 99 %

## 2020-04-08 DIAGNOSIS — Z00.00 WELL ADULT EXAM: Primary | ICD-10-CM

## 2020-04-08 LAB — SARS-COV-2 RDRP RESP QL NAA+PROBE: NEGATIVE

## 2020-04-08 PROCEDURE — 99282 EMERGENCY DEPT VISIT SF MDM: CPT

## 2020-04-08 PROCEDURE — U0002 COVID-19 LAB TEST NON-CDC: HCPCS

## 2020-04-08 PROCEDURE — 99282 EMERGENCY DEPT VISIT SF MDM: CPT | Mod: ,,, | Performed by: EMERGENCY MEDICINE

## 2020-04-08 PROCEDURE — 99282 PR EMERGENCY DEPT VISIT,LEVEL II: ICD-10-PCS | Mod: ,,, | Performed by: EMERGENCY MEDICINE

## 2020-04-08 NOTE — ED NOTES
Pt identifiers Kevin Lindo were checked and are  correct  LOC: The patient is awake, alert, aware of environment with an appropriate affect. Oriented x4, speaking appropriately  APPEARANCE: Pt resting comfortably, in no acute distress, pt is clean and well groomed, clothing properly fastened  SKIN: Skin warm, dry and intact, normal skin turgor, moist mucus membranes  RESPIRATORY: Airway is open and patent, respirations are spontaneous, even and unlabored, normal effort and rate Breath sounds clear tad to all lung fields on auscultation  CARDIAC: Normal rate and rhythm, no peripheral edema noted, capillary refill < 3 seconds, bilateral radial pulses 2+  ABDOMEN: Soft, nontender, nondistended. Bowel sounds present to all four quad of abd on auscultation  NEUROLOGIC:  facial expression is symmetrical, patient moving all extremities spontaneously, normal sensation in all extremities when touched with a finger.  Follows all commands appropriately  MUSCULOSKELETAL: No obvious deformities.

## 2020-04-08 NOTE — ED PROVIDER NOTES
Encounter Date: 4/8/2020       History     Chief Complaint   Patient presents with    detox     Pt presents to [facility] for reported fever, Pt reports taking Tylenol around 2 PM, No URI symtpoms. last used heroin yesterday     34-year-old mal who is currently in a detox facility was sent by the [facility] because he had a temperature of 99.8°.  Patient denies any complaints.  No cough congestion runny nose or shortness of breath.  States he does not know why he was sent here.  Is no abdominal pain, urinary symptoms or any other complaints.  Last heroin use was 2 days ago.        Review of patient's allergies indicates:  No Known Allergies  Past Medical History:   Diagnosis Date    H/O hernia repair      Past Surgical History:   Procedure Laterality Date    HERNIA REPAIR       Family History   Problem Relation Age of Onset    No Known Problems Mother     No Known Problems Father      Social History     Tobacco Use    Smoking status: Former Smoker    Smokeless tobacco: Never Used   Substance Use Topics    Alcohol use: Yes    Drug use: Yes     Types: Marijuana     Comment: heroine  Last use was 4/06/2020     Review of Systems   Constitutional: Negative for activity change, appetite change, chills and fever.   HENT: Negative for congestion and sinus pressure.    Eyes: Negative for discharge.   Respiratory: Negative for cough, choking, chest tightness and shortness of breath.    Cardiovascular: Negative for chest pain.   Gastrointestinal: Negative for abdominal pain, diarrhea and vomiting.   Genitourinary: Negative for difficulty urinating and dysuria.   Musculoskeletal: Negative for arthralgias.   Skin: Negative for color change.   Neurological: Negative for dizziness and headaches.   Hematological: Does not bruise/bleed easily.       Physical Exam     Initial Vitals [04/08/20 1651]   BP Pulse Resp Temp SpO2   (!) 170/88 98 16 98.9 °F (37.2 °C) 95 %      MAP       --         Physical Exam    Nursing  note and vitals reviewed.  Constitutional: He appears well-developed and well-nourished.  Non-toxic appearance. He does not have a sickly appearance. No distress.   HENT:   Head: Normocephalic and atraumatic.   Nose: Nose normal.   Mouth/Throat: Oropharynx is clear and moist.   Eyes: Conjunctivae, EOM and lids are normal. Pupils are equal, round, and reactive to light. Right eye exhibits no nystagmus. Left eye exhibits no nystagmus.   Neck: Trachea normal, normal range of motion and phonation normal. Neck supple. No stridor present.   Cardiovascular: Normal rate, regular rhythm, normal heart sounds and normal pulses. Exam reveals no gallop and no friction rub.    No murmur heard.  Pulmonary/Chest: Breath sounds normal. No respiratory distress. He has no wheezes. He has no rhonchi. He has no rales.   Abdominal: Soft. Normal appearance and bowel sounds are normal. He exhibits no distension. There is no tenderness. There is no rigidity, no rebound, no guarding, no tenderness at McBurney's point and negative Ramires's sign.   Neurological: He is alert and oriented to person, place, and time. He has normal strength and normal reflexes. He displays normal reflexes. No cranial nerve deficit or sensory deficit. He displays a negative Romberg sign. GCS score is 15. GCS eye subscore is 4. GCS verbal subscore is 5. GCS motor subscore is 6.   Skin: Skin is warm, dry and intact. Capillary refill takes less than 2 seconds. No rash noted. No pallor.   Psychiatric: His speech is normal and behavior is normal.         ED Course   Procedures  Labs Reviewed   SARS-COV-2 RNA AMPLIFICATION, QUAL    Narrative:     Required for rehab          Imaging Results    None          Medical Decision Making:   Initial Assessment:   34-year-old male currently in a detox facility for heroin use.  He has no complaints.  Attempted to call the [facility] at [phone].  Initially spoke with the  who requested I call the nurse's station at  [phone].  However when calling this is states this note number does not work.  Attempted:  The [facility] again and it went to [facility].  The patient is afebrile here.  He has no complaints.  I am making the assumption that they are concerned of COVID-19.  I will await their phone call but will go ahead and test the patient so that I can get him discharged back to the facility for rehab.  Otherwise I do not see any further need for any other testing and I am only testing for COVID-19 due to my suspicion that this is what they want prior to receiving the patient back.                   ED Course as of Apr 08 1835 Wed Apr 08, 2020 1835 COVID-19 negative.  Will discharge patient back to [facility].    Patient discharged home in stable condition. Diagnosis and treatment plan explained to patient and/or family member who is at bedside. I have answered all questions and the patient is satisfied with the plan of care. The patient demonstrates understanding of the care plan. This is the extent to the patients complaints today here in the emergency department.    [SM]      ED Course User Index  [SM] Rambo Washington DO                Clinical Impression:     1. Well adult exam               ED Disposition Condition    Discharge Stable        ED Prescriptions     None        Follow-up Information     Follow up With Specialties Details Why Contact Info    [facility] Nai Bateman  Schedule an appointment as soon as possible for a visit  As needed [address] [facility] [address]  [phone]                                       Rambo Washington DO  04/08/20 1836

## 2020-04-08 NOTE — ED TRIAGE NOTES
Pt is staying at [facility] , had his temperature take approx. 1 hour PTA which was 99.0 and was sent to the ED

## 2020-10-05 ENCOUNTER — OFFICE VISIT (OUTPATIENT)
Dept: URGENT CARE | Facility: CLINIC | Age: 35
End: 2020-10-05
Payer: MEDICAID

## 2020-10-05 VITALS
HEIGHT: 72 IN | TEMPERATURE: 98 F | WEIGHT: 180 LBS | BODY MASS INDEX: 24.38 KG/M2 | DIASTOLIC BLOOD PRESSURE: 80 MMHG | SYSTOLIC BLOOD PRESSURE: 133 MMHG | OXYGEN SATURATION: 99 % | HEART RATE: 73 BPM | RESPIRATION RATE: 14 BRPM

## 2020-10-05 DIAGNOSIS — B34.9 VIRAL SYNDROME: Primary | ICD-10-CM

## 2020-10-05 LAB
CTP QC/QA: YES
SARS-COV-2 RDRP RESP QL NAA+PROBE: NEGATIVE

## 2020-10-05 PROCEDURE — 99214 PR OFFICE/OUTPT VISIT, EST, LEVL IV, 30-39 MIN: ICD-10-PCS | Mod: S$GLB,,, | Performed by: PHYSICIAN ASSISTANT

## 2020-10-05 PROCEDURE — U0002: ICD-10-PCS | Mod: QW,S$GLB,, | Performed by: PHYSICIAN ASSISTANT

## 2020-10-05 PROCEDURE — 99214 OFFICE O/P EST MOD 30 MIN: CPT | Mod: S$GLB,,, | Performed by: PHYSICIAN ASSISTANT

## 2020-10-05 PROCEDURE — U0002 COVID-19 LAB TEST NON-CDC: HCPCS | Mod: QW,S$GLB,, | Performed by: PHYSICIAN ASSISTANT

## 2020-10-05 NOTE — PATIENT INSTRUCTIONS
"Take tylenol/motrin as needed for pain/fever.  -Rest and stay hydrated.    Please follow up with your primary care provider within 2-5 days if your signs and symptoms have not resolved or worsen.     If your condition worsens or fails to improve we recommend that you receive another evaluation at the emergency room immediately or contact your primary medical clinic to discuss your concerns.   You must understand that you have received an Urgent Care treatment only and that you may be released before all of your medical problems are known or treated. You, the patient, will arrange for follow up care as instructed.         Viral Syndrome (Adult)  A viral illness may cause a number of symptoms. The symptoms depend on the part of the body that the virus affects. If it settles in your nose, throat, and lungs, it may cause cough, sore throat, congestion, and sometimes headache. If it settles in your stomach and intestinal tract, it may cause vomiting and diarrhea. Sometimes it causes vague symptoms like "aching all over," feeling tired, loss of appetite, or fever.  A viral illness usually lasts 1 to 2 weeks, but sometimes it lasts longer. In some cases, a more serious infection can look like a viral syndrome in the first few days of the illness. You may need another exam and additional tests to know the difference. Watch for the warning signs listed below.  Home care  Follow these guidelines for taking care of yourself at home:  · If symptoms are severe, rest at home for the first 2 to 3 days.  · Stay away from cigarette smoke - both your smoke and the smoke from others.  · You may use over-the-counter acetaminophen or ibuprofen for fever, muscle aching, and headache, unless another medicine was prescribed for this. If you have chronic liver or kidney disease or ever had a stomach ulcer or GI bleeding, talk with your doctor before using these medicines. No one who is younger than 18 and ill with a fever should take " aspirin. It may cause severe disease or death.  · Your appetite may be poor, so a light diet is fine. Avoid dehydration by drinking 8 to 12 8-ounce glasses of fluids each day. This may include water; orange juice; lemonade; apple, grape, and cranberry juice; clear fruit drinks; electrolyte replacement and sports drinks; and decaffeinated teas and coffee. If you have been diagnosed with a kidney disease, ask your doctor how much and what types of fluids you should drink to prevent dehydration. If you have kidney disease, drinking too much fluid can cause it build up in the your body and be dangerous to your health.  · Over-the-counter remedies won't shorten the length of the illness but may be helpful for cough, sore throat; and nasal and sinus congestion. Don't use decongestants if you have high blood pressure.  Follow-up care  Follow up with your healthcare provider if you do not improve over the next week.  Call 911  Get emergency medical care if any of the following occur:  · Convulsion  · Feeling weak, dizzy, or like you are going to faint  · Chest pain, shortness of breath, wheezing, or difficulty breathing  When to seek medical advice  Call your healthcare provider right away if any of these occur:  · Cough with lots of colored sputum (mucus) or blood in your sputum  · Chest pain, shortness of breath, wheezing, or difficulty breathing  · Severe headache; face, neck, or ear pain  · Severe, constant pain in the lower right side of your belly (abdominal)  · Continued vomiting (cant keep liquids down)  · Frequent diarrhea (more than 5 times a day); blood (red or black color) or mucus in diarrhea  · Feeling weak, dizzy, or like you are going to faint  · Extreme thirst  · Fever of 100.4°F (38°C) or higher, or as directed by your healthcare provider  Date Last Reviewed: 9/25/2015  © 3441-3055 [employer]. [address] [place] [address], [place] [address]. All rights reserved. This information is not intended  as a substitute for professional medical care. Always follow your healthcare professional's instructions.

## 2020-10-05 NOTE — PROGRESS NOTES
Subjective:       Patient ID: Kevin Lindo is a 35 y.o. male.    Vitals:  height is 6' (1.829 m) and weight is 81.6 kg (180 lb). His temperature is 98.1 °F (36.7 °C). His blood pressure is 133/80 and his pulse is 73. His respiration is 14 and oxygen saturation is 99%.     Chief Complaint: COVID-19 Concerns    URI   This is a new problem. The current episode started in the past 7 days (2 Days Ago ). The problem has been gradually improving. The maximum temperature recorded prior to his arrival was 102 - 102.9 F (102 yesterday ). The fever has been present for less than 1 day (Fever started Saturday ). Associated symptoms include congestion, nausea, rhinorrhea, a sore throat and vomiting. Pertinent negatives include no coughing, ear pain, rash, sinus pain or wheezing. Treatments tried: Ibuprofen  The treatment provided significant relief.       Constitution: Positive for chills, sweating, fatigue and fever.   HENT: Positive for congestion and sore throat. Negative for ear pain, sinus pain, sinus pressure and voice change.    Neck: Negative for painful lymph nodes.   Eyes: Negative for eye redness.   Respiratory: Negative for chest tightness, cough, sputum production, bloody sputum, COPD, shortness of breath, stridor, wheezing and asthma.    Gastrointestinal: Positive for nausea and vomiting.   Musculoskeletal: Positive for muscle ache.   Skin: Negative for rash.   Allergic/Immunologic: Negative for seasonal allergies and asthma.   Hematologic/Lymphatic: Negative for swollen lymph nodes.       Objective:      Physical Exam   Constitutional: He is oriented to person, place, and time. He appears well-developed. He is cooperative. He does not appear ill. No distress.   HENT:   Head: Normocephalic and atraumatic.   Ears:   Right Ear: Hearing, tympanic membrane, external ear and ear canal normal.   Left Ear: Hearing, tympanic membrane, external ear and ear canal normal.   Nose: Mucosal edema present. No rhinorrhea.  Right sinus exhibits no maxillary sinus tenderness and no frontal sinus tenderness. Left sinus exhibits no maxillary sinus tenderness and no frontal sinus tenderness.   Mouth/Throat: Uvula is midline, oropharynx is clear and moist and mucous membranes are normal. No oropharyngeal exudate, posterior oropharyngeal edema, posterior oropharyngeal erythema or cobblestoning.   Eyes: Conjunctivae, EOM and lids are normal.   Neck: Trachea normal, full passive range of motion without pain and phonation normal. Neck supple.   Cardiovascular: Regular rhythm and normal heart sounds. Exam reveals no gallop.   No murmur heard.  Pulmonary/Chest: Effort normal and breath sounds normal. No respiratory distress. He has no decreased breath sounds. He has no wheezes. He has no rhonchi. He has no rales.   Musculoskeletal: Normal range of motion.   Neurological: He is alert and oriented to person, place, and time.   Skin: Skin is warm, dry, intact and not diaphoretic. Psychiatric: His speech is normal and behavior is normal. Judgment and thought content normal.   Nursing note and vitals reviewed.        Assessment:       1. Viral syndrome        Office Visit on 10/05/2020   Component Date Value Ref Range Status    POC Rapid COVID 10/05/2020 Negative  Negative Final     Acceptable 10/05/2020 Yes   Final     Plan:         Viral syndrome  -     POCT COVID-19 Rapid Screening      Patient Instructions   Take tylenol/motrin as needed for pain/fever.  -Rest and stay hydrated.    Please follow up with your primary care provider within 2-5 days if your signs and symptoms have not resolved or worsen.     If your condition worsens or fails to improve we recommend that you receive another evaluation at the emergency room immediately or contact your primary medical clinic to discuss your concerns.   You must understand that you have received an Urgent Care treatment only and that you may be released before all of your medical  "problems are known or treated. You, the patient, will arrange for follow up care as instructed.         Viral Syndrome (Adult)  A viral illness may cause a number of symptoms. The symptoms depend on the part of the body that the virus affects. If it settles in your nose, throat, and lungs, it may cause cough, sore throat, congestion, and sometimes headache. If it settles in your stomach and intestinal tract, it may cause vomiting and diarrhea. Sometimes it causes vague symptoms like "aching all over," feeling tired, loss of appetite, or fever.  A viral illness usually lasts 1 to 2 weeks, but sometimes it lasts longer. In some cases, a more serious infection can look like a viral syndrome in the first few days of the illness. You may need another exam and additional tests to know the difference. Watch for the warning signs listed below.  Home care  Follow these guidelines for taking care of yourself at home:  · If symptoms are severe, rest at home for the first 2 to 3 days.  · Stay away from cigarette smoke - both your smoke and the smoke from others.  · You may use over-the-counter acetaminophen or ibuprofen for fever, muscle aching, and headache, unless another medicine was prescribed for this. If you have chronic liver or kidney disease or ever had a stomach ulcer or GI bleeding, talk with your doctor before using these medicines. No one who is younger than 18 and ill with a fever should take aspirin. It may cause severe disease or death.  · Your appetite may be poor, so a light diet is fine. Avoid dehydration by drinking 8 to 12 8-ounce glasses of fluids each day. This may include water; orange juice; lemonade; apple, grape, and cranberry juice; clear fruit drinks; electrolyte replacement and sports drinks; and decaffeinated teas and coffee. If you have been diagnosed with a kidney disease, ask your doctor how much and what types of fluids you should drink to prevent dehydration. If you have kidney disease, " drinking too much fluid can cause it build up in the your body and be dangerous to your health.  · Over-the-counter remedies won't shorten the length of the illness but may be helpful for cough, sore throat; and nasal and sinus congestion. Don't use decongestants if you have high blood pressure.  Follow-up care  Follow up with your healthcare provider if you do not improve over the next week.  Call 911  Get emergency medical care if any of the following occur:  · Convulsion  · Feeling weak, dizzy, or like you are going to faint  · Chest pain, shortness of breath, wheezing, or difficulty breathing  When to seek medical advice  Call your healthcare provider right away if any of these occur:  · Cough with lots of colored sputum (mucus) or blood in your sputum  · Chest pain, shortness of breath, wheezing, or difficulty breathing  · Severe headache; face, neck, or ear pain  · Severe, constant pain in the lower right side of your belly (abdominal)  · Continued vomiting (cant keep liquids down)  · Frequent diarrhea (more than 5 times a day); blood (red or black color) or mucus in diarrhea  · Feeling weak, dizzy, or like you are going to faint  · Extreme thirst  · Fever of 100.4°F (38°C) or higher, or as directed by your healthcare provider  Date Last Reviewed: 9/25/2015  © 1620-2614 [employer]. [address] [place] [address], [place] [address]. All rights reserved. This information is not intended as a substitute for professional medical care. Always follow your healthcare professional's instructions.

## 2021-04-15 ENCOUNTER — PATIENT MESSAGE (OUTPATIENT)
Dept: RESEARCH | Facility: HOSPITAL | Age: 36
End: 2021-04-15

## 2022-02-14 ENCOUNTER — OFFICE VISIT (OUTPATIENT)
Dept: URGENT CARE | Facility: CLINIC | Age: 37
End: 2022-02-14
Payer: COMMERCIAL

## 2022-02-14 VITALS
SYSTOLIC BLOOD PRESSURE: 150 MMHG | WEIGHT: 185 LBS | HEART RATE: 86 BPM | RESPIRATION RATE: 16 BRPM | OXYGEN SATURATION: 97 % | BODY MASS INDEX: 25.06 KG/M2 | DIASTOLIC BLOOD PRESSURE: 87 MMHG | TEMPERATURE: 98 F | HEIGHT: 72 IN

## 2022-02-14 DIAGNOSIS — J02.9 SORE THROAT: ICD-10-CM

## 2022-02-14 DIAGNOSIS — J02.9 VIRAL PHARYNGITIS: Primary | ICD-10-CM

## 2022-02-14 LAB
CTP QC/QA: YES
CTP QC/QA: YES
MOLECULAR STREP A: NEGATIVE
SARS-COV-2 RDRP RESP QL NAA+PROBE: NEGATIVE

## 2022-02-14 PROCEDURE — 1159F PR MEDICATION LIST DOCUMENTED IN MEDICAL RECORD: ICD-10-PCS | Mod: CPTII,S$GLB,,

## 2022-02-14 PROCEDURE — 3077F SYST BP >= 140 MM HG: CPT | Mod: CPTII,S$GLB,,

## 2022-02-14 PROCEDURE — 87651 STREP A DNA AMP PROBE: CPT | Mod: QW,S$GLB,,

## 2022-02-14 PROCEDURE — 99213 PR OFFICE/OUTPT VISIT, EST, LEVL III, 20-29 MIN: ICD-10-PCS | Mod: S$GLB,,,

## 2022-02-14 PROCEDURE — 3079F PR MOST RECENT DIASTOLIC BLOOD PRESSURE 80-89 MM HG: ICD-10-PCS | Mod: CPTII,S$GLB,,

## 2022-02-14 PROCEDURE — 3077F PR MOST RECENT SYSTOLIC BLOOD PRESSURE >= 140 MM HG: ICD-10-PCS | Mod: CPTII,S$GLB,,

## 2022-02-14 PROCEDURE — 1160F RVW MEDS BY RX/DR IN RCRD: CPT | Mod: CPTII,S$GLB,,

## 2022-02-14 PROCEDURE — 3008F PR BODY MASS INDEX (BMI) DOCUMENTED: ICD-10-PCS | Mod: CPTII,S$GLB,,

## 2022-02-14 PROCEDURE — U0002: ICD-10-PCS | Mod: QW,S$GLB,,

## 2022-02-14 PROCEDURE — 99213 OFFICE O/P EST LOW 20 MIN: CPT | Mod: S$GLB,,,

## 2022-02-14 PROCEDURE — 1160F PR REVIEW ALL MEDS BY PRESCRIBER/CLIN PHARMACIST DOCUMENTED: ICD-10-PCS | Mod: CPTII,S$GLB,,

## 2022-02-14 PROCEDURE — 1159F MED LIST DOCD IN RCRD: CPT | Mod: CPTII,S$GLB,,

## 2022-02-14 PROCEDURE — 3079F DIAST BP 80-89 MM HG: CPT | Mod: CPTII,S$GLB,,

## 2022-02-14 PROCEDURE — 87651 POCT STREP A MOLECULAR: ICD-10-PCS | Mod: QW,S$GLB,,

## 2022-02-14 PROCEDURE — U0002 COVID-19 LAB TEST NON-CDC: HCPCS | Mod: QW,S$GLB,,

## 2022-02-14 PROCEDURE — 3008F BODY MASS INDEX DOCD: CPT | Mod: CPTII,S$GLB,,

## 2022-02-14 RX ORDER — PREDNISONE 20 MG/1
20 TABLET ORAL DAILY
Qty: 4 TABLET | Refills: 0 | Status: SHIPPED | OUTPATIENT
Start: 2022-02-14 | End: 2022-02-18

## 2022-02-14 NOTE — PATIENT INSTRUCTIONS
Patient Education       Viral Pharyngitis Discharge Instructions   About this topic   Viral pharyngitis is a sore throat which is likely caused by a virus. Most of the time, a sore throat will go away without antibiotics in a week or two. If you have strep throat, which is caused by bacteria, you will need to take an antibiotic.           What care is needed at home?   · Ask your doctor what you need to do when you go home. Make sure you ask questions if you do not understand what the doctor says.  · To help ease a sore throat you can:  ? Use a sore throat spray.  ? Suck on hard candy or throat lozenges.  ? Gargle with warm saltwater a few times each day. Mix 1/2  teaspoon (2.5 grams) salt in 8 ounces (240 mL) of warm water  · You may want to take medicine like acetaminophen, ibuprofen, or naproxen for pain.  · If you decide to take over-the-counter cough or cold medicines, follow the directions on the label carefully. Be sure you do not take more than one medicine that contains acetaminophen. Also, if you have a heart problem or high blood pressure, check with your doctor before you take any of these medicines.  · Wash your hands often. This will help keep others healthy.  · Do not smoke. Stay away from others who are smoking.  What follow-up care is needed?   Your doctor may ask you to make visits to the office to check on your progress. Be sure to keep these visits.  What drugs may be needed?   The doctor may order drugs to:   · Help with pain   · Soothe the throat  · Lower fever  Will physical activity be limited?   You may need to rest at home for 1 to 2 days or until you are feeling well.   What changes to diet are needed?   · If your throat feels too sore to eat solid foods, you may drink water, juice, milk, milkshakes, or soups.  · Do not drink sports drinks, soft drinks, or drinks with a lot of sugar. These may cause fluid loss and bother your throat.  · Stay away from caffeine; acidic juices like orange  juice or lemonade; and beer, wine, and mixed drinks (alcohol). These can worsen your signs.  What problems could happen?   · Ear or sinus infection  · Asthma attack  · Lung problems like pneumonia or bronchitis  · Abscess in the throat  · Very bad fluid loss. This is dehydration.  What can be done to prevent this health problem?   · Wash your hands often with soap and water for at least 20 seconds, especially after coughing or sneezing. Alcohol-based hand sanitizers also work to kill germs.  · If you are sick, cover your mouth and nose with tissue when you cough or sneeze. You can also cough or sneeze into your elbow. Throw away tissues in the trash and wash your hands after touching used tissues.  · Do not get too close (kissing, hugging) to people who are sick.  · Avoid going to crowded places. Avoid touching your eyes, mouth, or nose if you are around people.  · Avoid sharing your towels or hankies with anyone who is sick. Clean often handled things like door handles, remotes, toys, and phones. Wipe them with a disinfectant.  · Do not share knives and forks or drinking glasses with someone who has a sore throat. Wash these objects with hot, soapy water.  · Get at least 6 to 8 hours of sleep each night.  · Get a flu shot each year.  When do I need to call the doctor?   · You have trouble breathing.  · Your neck, tongue, or throat is swelling.  · You are drooling because you cannot swallow your saliva.  · You have very bad pain in your throat that keeps you from eating or drinking anything.  · There are large, painful lumps in your neck.  · You have blisters in the back of your throat.  Teach Back: Helping You Understand   The Teach Back Method helps you understand the information we are giving you. After you talk with the staff, tell them in your own words what you learned. This helps to make sure the staff has described each thing clearly. It also helps to explain things that may have been confusing. Before going  home, make sure you can do these:  · I can tell you about my condition.  · I can tell you what may help ease my sore throat.  · I can tell you what I can do to help avoid passing the infection to others.  · I can tell you what I will do if I have trouble breathing, drooling, or swelling of the throat, tongue, or neck.  Where can I learn more?   American Academy of Otolaryngology - Head and Neck Surgery  https://www.enthealth.org/conditions/sore-throats/   CDC  https://www.cdc.gov/antibiotic-use/community/for-patients/common-illnesses/sore-throat.html   NHS Choices  http://www.nhs.uk/conditions/Sore-throat/Pages/Introduction.aspx   Last Reviewed Date   2021-06-22  Consumer Information Use and Disclaimer   This information is not specific medical advice and does not replace information you receive from your health care provider. This is only a brief summary of general information. It does NOT include all information about conditions, illnesses, injuries, tests, procedures, treatments, therapies, discharge instructions or life-style choices that may apply to you. You must talk with your health care provider for complete information about your health and treatment options. This information should not be used to decide whether or not to accept your health care providers advice, instructions or recommendations. Only your health care provider has the knowledge and training to provide advice that is right for you.   Copyright   Copyright © 2021 [employer] and its affiliates and/or licensors. All rights reserved.

## 2022-02-14 NOTE — PROGRESS NOTES
Subjective:       Patient ID: Kevin Lindo is a 36 y.o. male.    Vitals:  height is 6' (1.829 m) and weight is 83.9 kg (185 lb). His temperature is 97.6 °F (36.4 °C). His blood pressure is 150/87 (abnormal) and his pulse is 86. His respiration is 16 and oxygen saturation is 97%.     Chief Complaint: Sore Throat    36-year-old male complains of sore throat that began yesterday.  Patient states that the sore throat is getting worse any has painful swallowing.  Left side hurts more than the right side.  No tonsillar exudates.  Patient has not had any fever, cough, congestion.  Patient's wife and son tested positive for COVID week ago.  He has been taking ibuprofen without any relief of symptoms.  No history of mono.  Patient has history of strep pharyngitis yearly.    Sore Throat   This is a new problem. The current episode started yesterday. The problem has been gradually worsening. Sore throat worse side: entire  There has been no fever. The pain is at a severity of 0/10. The patient is experiencing no pain. Associated symptoms include swollen glands and trouble swallowing (Painful swallowing). Pertinent negatives include no abdominal pain, congestion, coughing, diarrhea, drooling, ear discharge, ear pain, headaches, hoarse voice, plugged ear sensation, neck pain, shortness of breath, stridor or vomiting. He has had no exposure to strep or mono. He has tried acetaminophen and NSAIDs for the symptoms. The treatment provided no relief.       Constitution: Negative for chills, sweating, fatigue and fever.   HENT: Positive for sore throat and trouble swallowing (Painful swallowing). Negative for ear pain, ear discharge, drooling and congestion.    Neck: Negative for neck pain.   Respiratory: Negative for cough, shortness of breath and stridor.    Gastrointestinal: Negative for abdominal pain, vomiting and diarrhea.   Neurological: Negative for headaches.       Objective:      Physical Exam   Constitutional: He  is oriented to person, place, and time. He appears well-developed and well-nourished. He is cooperative.  Non-toxic appearance. He does not appear ill. No distress.   HENT:   Head: Normocephalic and atraumatic.   Ears:   Right Ear: Hearing, tympanic membrane, external ear and ear canal normal.   Left Ear: Hearing, tympanic membrane, external ear and ear canal normal.   Nose: Nose normal. No mucosal edema, rhinorrhea, nasal deformity or congestion. No epistaxis. Right sinus exhibits no maxillary sinus tenderness and no frontal sinus tenderness. Left sinus exhibits no maxillary sinus tenderness and no frontal sinus tenderness.   Mouth/Throat: Uvula is midline and mucous membranes are normal. Mucous membranes are moist. No trismus in the jaw. Normal dentition. No uvula swelling. Posterior oropharyngeal erythema present. No oropharyngeal exudate or cobblestoning. Tonsils are 1+ on the right. Tonsils are 1+ on the left. No tonsillar exudate.   Eyes: Conjunctivae and lids are normal. Right eye exhibits no discharge. Left eye exhibits no discharge. No scleral icterus.   Neck: Trachea normal and phonation normal. Neck supple.   Cardiovascular: Normal rate, regular rhythm, normal heart sounds, intact distal pulses and normal pulses.   Pulmonary/Chest: Effort normal and breath sounds normal. No respiratory distress. He has no wheezes. He has no rhonchi.   Abdominal: Normal appearance.   Musculoskeletal: Normal range of motion.         General: No deformity or edema. Normal range of motion.   Neurological: He is alert and oriented to person, place, and time. He exhibits normal muscle tone. Coordination normal.   Skin: Skin is warm, dry, intact, not diaphoretic and not pale.   Psychiatric: He has a normal mood and affect. His speech is normal and behavior is normal. Judgment and thought content normal.   Nursing note and vitals reviewed.        Results for orders placed or performed in visit on 02/14/22   POCT COVID-19 Rapid  Screening   Result Value Ref Range    POC Rapid COVID Negative Negative     Acceptable Yes    POCT Strep A, Molecular   Result Value Ref Range    Molecular Strep A, POC Negative Negative     Acceptable Yes        Assessment:       1. Viral pharyngitis    2. Sore throat          Plan:       Centor criteria score:  1.     Discussed negative strep and COVID test results with patient.  Patient shows no signs of tonsillar exudate, cervical lymphadenopathy heart tenderness to palpation of lymph nodes.  Recommended over-the-counter regimens like saltwater gargles, via ibuprofen and warm teas in combination with magic mouthwash and short course of oral steroids for inflammation.  Patient verbalized understanding.  Recommended patient return to clinic if symptoms do not resolve or if he develops any tonsillar exudates.  No indication for antibiotics at this time.  Patient verbalized understanding and agreed with treatment plan.      Viral pharyngitis  -     (Magic mouthwash) 1:1:1 diphenhydramine(Benadryl) 12.5mg/5ml liq, aluminum & magnesium hydroxide-simethicone (Maalox), LIDOcaine viscous 2%; Swish and spit 5 mLs every 4 (four) hours as needed (sore throat). For sore throat  Dispense: 90 mL; Refill: 0  -     predniSONE (DELTASONE) 20 MG tablet; Take 1 tablet (20 mg total) by mouth once daily. for 4 days  Dispense: 4 tablet; Refill: 0    Sore throat  -     POCT COVID-19 Rapid Screening  -     POCT Strep A, Molecular          Patient Instructions   Patient Education       Viral Pharyngitis Discharge Instructions   About this topic   Viral pharyngitis is a sore throat which is likely caused by a virus. Most of the time, a sore throat will go away without antibiotics in a week or two. If you have strep throat, which is caused by bacteria, you will need to take an antibiotic.           What care is needed at home?   · Ask your doctor what you need to do when you go home. Make sure you ask  questions if you do not understand what the doctor says.  · To help ease a sore throat you can:  ? Use a sore throat spray.  ? Suck on hard candy or throat lozenges.  ? Gargle with warm saltwater a few times each day. Mix 1/2  teaspoon (2.5 grams) salt in 8 ounces (240 mL) of warm water  · You may want to take medicine like acetaminophen, ibuprofen, or naproxen for pain.  · If you decide to take over-the-counter cough or cold medicines, follow the directions on the label carefully. Be sure you do not take more than one medicine that contains acetaminophen. Also, if you have a heart problem or high blood pressure, check with your doctor before you take any of these medicines.  · Wash your hands often. This will help keep others healthy.  · Do not smoke. Stay away from others who are smoking.  What follow-up care is needed?   Your doctor may ask you to make visits to the office to check on your progress. Be sure to keep these visits.  What drugs may be needed?   The doctor may order drugs to:   · Help with pain   · Soothe the throat  · Lower fever  Will physical activity be limited?   You may need to rest at home for 1 to 2 days or until you are feeling well.   What changes to diet are needed?   · If your throat feels too sore to eat solid foods, you may drink water, juice, milk, milkshakes, or soups.  · Do not drink sports drinks, soft drinks, or drinks with a lot of sugar. These may cause fluid loss and bother your throat.  · Stay away from caffeine; acidic juices like orange juice or lemonade; and beer, wine, and mixed drinks (alcohol). These can worsen your signs.  What problems could happen?   · Ear or sinus infection  · Asthma attack  · Lung problems like pneumonia or bronchitis  · Abscess in the throat  · Very bad fluid loss. This is dehydration.  What can be done to prevent this health problem?   · Wash your hands often with soap and water for at least 20 seconds, especially after coughing or sneezing.  Alcohol-based hand sanitizers also work to kill germs.  · If you are sick, cover your mouth and nose with tissue when you cough or sneeze. You can also cough or sneeze into your elbow. Throw away tissues in the trash and wash your hands after touching used tissues.  · Do not get too close (kissing, hugging) to people who are sick.  · Avoid going to crowded places. Avoid touching your eyes, mouth, or nose if you are around people.  · Avoid sharing your towels or hankies with anyone who is sick. Clean often handled things like door handles, remotes, toys, and phones. Wipe them with a disinfectant.  · Do not share knives and forks or drinking glasses with someone who has a sore throat. Wash these objects with hot, soapy water.  · Get at least 6 to 8 hours of sleep each night.  · Get a flu shot each year.  When do I need to call the doctor?   · You have trouble breathing.  · Your neck, tongue, or throat is swelling.  · You are drooling because you cannot swallow your saliva.  · You have very bad pain in your throat that keeps you from eating or drinking anything.  · There are large, painful lumps in your neck.  · You have blisters in the back of your throat.  Teach Back: Helping You Understand   The Teach Back Method helps you understand the information we are giving you. After you talk with the staff, tell them in your own words what you learned. This helps to make sure the staff has described each thing clearly. It also helps to explain things that may have been confusing. Before going home, make sure you can do these:  · I can tell you about my condition.  · I can tell you what may help ease my sore throat.  · I can tell you what I can do to help avoid passing the infection to others.  · I can tell you what I will do if I have trouble breathing, drooling, or swelling of the throat, tongue, or neck.  Where can I learn more?   American Academy of Otolaryngology - Head and Neck  Surgery  https://www.enthealth.org/conditions/sore-throats/   CDC  https://www.cdc.gov/antibiotic-use/community/for-patients/common-illnesses/sore-throat.html   NHS Choices  http://www.nhs.uk/conditions/Sore-throat/Pages/Introduction.aspx   Last Reviewed Date   2021-06-22  Consumer Information Use and Disclaimer   This information is not specific medical advice and does not replace information you receive from your health care provider. This is only a brief summary of general information. It does NOT include all information about conditions, illnesses, injuries, tests, procedures, treatments, therapies, discharge instructions or life-style choices that may apply to you. You must talk with your health care provider for complete information about your health and treatment options. This information should not be used to decide whether or not to accept your health care providers advice, instructions or recommendations. Only your health care provider has the knowledge and training to provide advice that is right for you.   Copyright   Copyright © 2021 [employer]ToDate, Inc. and its affiliates and/or licensors. All rights reserved.

## 2022-11-26 ENCOUNTER — HOSPITAL ENCOUNTER (EMERGENCY)
Facility: HOSPITAL | Age: 37
Discharge: HOME | End: 2022-11-26
Attending: STUDENT IN AN ORGANIZED HEALTH CARE EDUCATION/TRAINING PROGRAM
Payer: COMMERCIAL

## 2022-11-26 VITALS
HEART RATE: 86 BPM | TEMPERATURE: 98 F | DIASTOLIC BLOOD PRESSURE: 93 MMHG | RESPIRATION RATE: 20 BRPM | HEIGHT: 72 IN | BODY MASS INDEX: 25.06 KG/M2 | SYSTOLIC BLOOD PRESSURE: 140 MMHG | OXYGEN SATURATION: 97 % | WEIGHT: 185 LBS

## 2022-11-26 DIAGNOSIS — S52.045A CLOSED NONDISPLACED FRACTURE OF CORONOID PROCESS OF LEFT ULNA, INITIAL ENCOUNTER: Primary | ICD-10-CM

## 2022-11-26 DIAGNOSIS — R52 PAIN: ICD-10-CM

## 2022-11-26 PROCEDURE — 29105 APPLICATION LONG ARM SPLINT: CPT | Mod: LT

## 2022-11-26 PROCEDURE — 99284 EMERGENCY DEPT VISIT MOD MDM: CPT | Mod: 25

## 2022-11-26 PROCEDURE — 25000003 PHARM REV CODE 250: Performed by: STUDENT IN AN ORGANIZED HEALTH CARE EDUCATION/TRAINING PROGRAM

## 2022-11-26 RX ORDER — OXYCODONE AND ACETAMINOPHEN 5; 325 MG/1; MG/1
1 TABLET ORAL EVERY 4 HOURS PRN
Qty: 6 TABLET | Refills: 0 | Status: SHIPPED | OUTPATIENT
Start: 2022-11-26

## 2022-11-26 RX ORDER — OXYCODONE AND ACETAMINOPHEN 5; 325 MG/1; MG/1
1 TABLET ORAL
Status: DISCONTINUED | OUTPATIENT
Start: 2022-11-26 | End: 2022-11-26

## 2022-11-26 RX ORDER — IBUPROFEN 600 MG/1
600 TABLET ORAL EVERY 6 HOURS PRN
Qty: 20 TABLET | Refills: 0 | Status: SHIPPED | OUTPATIENT
Start: 2022-11-26 | End: 2022-12-14

## 2022-11-26 RX ORDER — IBUPROFEN 600 MG/1
600 TABLET ORAL
Status: COMPLETED | OUTPATIENT
Start: 2022-11-26 | End: 2022-11-26

## 2022-11-26 RX ORDER — METHOCARBAMOL 500 MG/1
1000 TABLET, FILM COATED ORAL 3 TIMES DAILY
Qty: 30 TABLET | Refills: 0 | Status: SHIPPED | OUTPATIENT
Start: 2022-11-26 | End: 2022-12-01

## 2022-11-26 RX ADMIN — IBUPROFEN 600 MG: 600 TABLET ORAL at 05:11

## 2022-11-26 NOTE — DISCHARGE INSTRUCTIONS
Thank you for coming to our Emergency Department today. It is important to remember that some problems are difficult to diagnose and may not be found during your first visit. Be sure to follow up with your primary care doctor and review any labs/imaging that was performed with them. If you do not have a primary care doctor, you may contact the one listed on your discharge paperwork or you may also call the [facility] Appointment Desk at [phone] to schedule an appointment with one.     All medications may potentially have side effects and it is impossible to predict which medications may give you side effects. If you feel that you are having a negative effect of any medication you should immediately stop taking them and seek medical attention.    Return to the ER with any questions/concerns, new/concerning symptoms, worsening or failure to improve. Do not drive or make any important decisions for 24 hours if you have received any pain medications, sedatives or mood altering drugs during your ER visit.

## 2022-12-01 ENCOUNTER — TELEPHONE (OUTPATIENT)
Dept: ORTHOPEDICS | Facility: CLINIC | Age: 37
End: 2022-12-01

## 2022-12-01 NOTE — TELEPHONE ENCOUNTER
----- Message from Juli Arshad sent at 12/1/2022  1:08 PM CST -----  Name of Caller pt   Reason for Visit/Symptoms pt requesting to reschedule his appt that is for tomorrow to 12/5/22 in the morning. Call pt   Best Contact Number or Confirm if Mychart Preferred [phone] (home)     Preferred Date/Time of Appointment 12/5/22   Interested in Virtual Visit (yes/no)  Additional Information

## 2022-12-14 ENCOUNTER — OFFICE VISIT (OUTPATIENT)
Dept: ORTHOPEDICS | Facility: CLINIC | Age: 37
End: 2022-12-14
Payer: COMMERCIAL

## 2022-12-14 DIAGNOSIS — S52.045A CLOSED NONDISPLACED FRACTURE OF CORONOID PROCESS OF LEFT ULNA, INITIAL ENCOUNTER: Primary | ICD-10-CM

## 2022-12-14 PROCEDURE — 1159F MED LIST DOCD IN RCRD: CPT | Mod: CPTII,S$GLB,, | Performed by: ORTHOPAEDIC SURGERY

## 2022-12-14 PROCEDURE — 99999 PR PBB SHADOW E&M-EST. PATIENT-LVL III: ICD-10-PCS | Mod: PBBFAC,,, | Performed by: ORTHOPAEDIC SURGERY

## 2022-12-14 PROCEDURE — 99204 OFFICE O/P NEW MOD 45 MIN: CPT | Mod: S$GLB,,, | Performed by: ORTHOPAEDIC SURGERY

## 2022-12-14 PROCEDURE — 1160F PR REVIEW ALL MEDS BY PRESCRIBER/CLIN PHARMACIST DOCUMENTED: ICD-10-PCS | Mod: CPTII,S$GLB,, | Performed by: ORTHOPAEDIC SURGERY

## 2022-12-14 PROCEDURE — 99204 PR OFFICE/OUTPT VISIT, NEW, LEVL IV, 45-59 MIN: ICD-10-PCS | Mod: S$GLB,,, | Performed by: ORTHOPAEDIC SURGERY

## 2022-12-14 PROCEDURE — 1159F PR MEDICATION LIST DOCUMENTED IN MEDICAL RECORD: ICD-10-PCS | Mod: CPTII,S$GLB,, | Performed by: ORTHOPAEDIC SURGERY

## 2022-12-14 PROCEDURE — 1160F RVW MEDS BY RX/DR IN RCRD: CPT | Mod: CPTII,S$GLB,, | Performed by: ORTHOPAEDIC SURGERY

## 2022-12-14 PROCEDURE — 99999 PR PBB SHADOW E&M-EST. PATIENT-LVL III: CPT | Mod: PBBFAC,,, | Performed by: ORTHOPAEDIC SURGERY

## 2022-12-14 RX ORDER — IBUPROFEN 800 MG/1
800 TABLET ORAL 3 TIMES DAILY
Qty: 90 TABLET | Refills: 0 | Status: SHIPPED | OUTPATIENT
Start: 2022-12-14

## 2022-12-14 NOTE — LETTER
December 14, 2022    Kevin Lindo  [address] [facility] [address]         [facility]  [address]  [place] [address]  Phone: [phone] December 14, 2022     Patient: Kevin Lindo   YOB: 1985   Date of Visit: 12/14/2022       To Whom It May Concern:    It is my medical opinion that Kevin Lindo should remain out of work until further evaluated in three weeks. .    If you have any questions or concerns, please don't hesitate to call.    Sincerely,        Danya Paredes MD

## 2022-12-14 NOTE — PROGRESS NOTES
Assessment: 37 y.o. male with left nondisplaced coronoid fracture.  No evidence of instability on exam    I explained my diagnostic impression and the reasoning behind it in detail, using layman's terms.      Plan:   - urgent PT referral   - taken out of splint.  Kary dobson, recommended minimal use of this  - CT elbow - will call with results  - Return to clinic in 3 weeks. Return sooner if symptoms worsen or fail to improve.    All questions were answered in detail. The patient is in full agreement with the treatment plan and will proceed accordingly.    Chief Complaint   Patient presents with    Left Elbow - Injury, Pain       Initial visit (12/14/2022): Kevin Lindo is a 37 y.o. male who presents today complaining of Injury and Pain of the Left Elbow     States that his arm was twisted behind him about 3 weeks ago.  He was seen in the ER at that time and placed into a splint.  He was found to have a nondisplaced isolated coronoid fracture.  Has been taking the splint on and off to shower.  He is not been doing any range of motion of the elbow.  Denies numbness and tingling into the hand.  He does have pain but does not have any symptoms of instability.  He does not recall any sensation of dislocation with this injury.  He mostly has pain at night rather than with activity      This patient was seen in consultation at the request of Dr. Nelson Zuniga    This is the extent of the patient's complaints at this time.     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 unable to work doing this right now because there was no light duty so he is working as a      Review of patient's allergies indicates:  No Known Allergies      Current Outpatient Medications:     fish oil-omega-3 fatty acids 300-1,000 mg capsule, Take 2 g by mouth once daily., Disp: , Rfl:     multivitamin capsule, Take 1 capsule by mouth once daily., Disp: , Rfl:     oxyCODONE-acetaminophen (PERCOCET)  5-325 mg per tablet, Take 1 tablet by mouth every 4 (four) hours as needed for Pain., Disp: 6 tablet, Rfl: 0    ibuprofen (ADVIL,MOTRIN) 800 MG tablet, Take 1 tablet (800 mg total) by mouth 3 (three) times daily., Disp: 90 tablet, Rfl: 0    Physical Exam:   Vitals:    12/14/22 0901   PainSc:   6   PainLoc: Elbow       General:   Patient is alert, awake and oriented to time, place and person. Mood and affect are appropriate.  Patient does not appear to be in any distress, denies any constitutional symptoms and appears stated age.   HEENT:  Pupils are equal and round, sclera are not injected. External examination of ears and nose reveals no abnormalities. Cranial nerves II-X are grossly intact  Skin:  no rashes, abrasions or open wounds on the affected extremity   Resp: No respiratory distress or audible wheezing   CV: 2+  pulses, all extremities warm and well perfused   Left Elbow     OBSERVATION / INSPECTION    Swelling                       positive                 Deformity                     absent     Discoloration               absent              Scars                absent     Atrophy                        absent        ROM:   ('*' = with pain)               Right Elbow                 AROM (PROM)                         Extension                    0 deg  (0 deg)   Flexion                         150 deg (155 deg)                                  Pronation                     80 deg  (80 deg)                                   Supination                   80 deg  (80 deg)                                                                                                 Left Elbow                   AROM (PROM)                         Extension                    40 deg  (40 deg)   Flexion                         100 deg (100 deg)                                  Pronation                     60 deg  (60 deg)                                   Supination                   50 deg  (50 deg)                                        STRENGTH:   ('*' = with pain)              Testing deferred    Tender to palpation over triceps insertion   Nontender over radial head nontender over anterior elbow     ELBOW EXAMINATION:  See above noted areas of tenderness.   Test for Ligamentous Instability - UCL           Normal  Test for Ligamentous Instability - LUCL         Normal  PLRI                                                                negative  Valgus/Extension Overload Test                    negative  Moving Valgus                                                negative       EXTREMITY NEURO-VASCULAR EXAMINATION: Sensation grossly intact to light touch all dermatomal regions. DTR 2+ Biceps, Triceps, BR and Negative Ly's sign. Grossly intact motor function at Elbow, Wrist and Hand. Distal pulses radial and ulnar 2+, brisk cap refill, symmetric.       Imaging:  Three views of the left elbow show a nondisplaced coronoid fracture    I personally reviewed and interpreted the patient's imaging obtained today in clinic       A note notifying Dr. Nelson Zuniga of my findings was sent via the electronic medical record     This note was created by combination of typed  and M-Modal dictation. Transcription and phonetic errors may be present.  If there are any questions, please contact me.    

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
## 2024-10-23 ENCOUNTER — HOSPITAL ENCOUNTER (EMERGENCY)
Facility: HOSPITAL | Age: 39
Discharge: HOME | End: 2024-10-23
Attending: EMERGENCY MEDICINE
Payer: COMMERCIAL

## 2024-10-23 VITALS
RESPIRATION RATE: 20 BRPM | TEMPERATURE: 98 F | HEART RATE: 98 BPM | DIASTOLIC BLOOD PRESSURE: 120 MMHG | WEIGHT: 185 LBS | BODY MASS INDEX: 25.06 KG/M2 | HEIGHT: 72 IN | SYSTOLIC BLOOD PRESSURE: 178 MMHG | OXYGEN SATURATION: 98 %

## 2024-10-23 DIAGNOSIS — M54.6 ACUTE BILATERAL THORACIC BACK PAIN: Primary | ICD-10-CM

## 2024-10-23 PROCEDURE — 25000003 PHARM REV CODE 250: Performed by: EMERGENCY MEDICINE

## 2024-10-23 PROCEDURE — 99283 EMERGENCY DEPT VISIT LOW MDM: CPT

## 2024-10-23 RX ORDER — METHOCARBAMOL 500 MG/1
1000 TABLET, FILM COATED ORAL 4 TIMES DAILY PRN
Qty: 30 TABLET | Refills: 0 | Status: SHIPPED | OUTPATIENT
Start: 2024-10-23

## 2024-10-23 RX ORDER — LIDOCAINE 50 MG/G
1 PATCH TOPICAL
Status: DISCONTINUED | OUTPATIENT
Start: 2024-10-23 | End: 2024-10-23 | Stop reason: HOSPADM

## 2024-10-23 RX ADMIN — LIDOCAINE 1 PATCH: 700 PATCH TOPICAL at 01:10

## 2025-02-27 ENCOUNTER — HOSPITAL ENCOUNTER (EMERGENCY)
Facility: HOSPITAL | Age: 40
Discharge: HOME | End: 2025-02-27
Attending: EMERGENCY MEDICINE

## 2025-02-27 VITALS
HEART RATE: 115 BPM | DIASTOLIC BLOOD PRESSURE: 102 MMHG | TEMPERATURE: 98 F | SYSTOLIC BLOOD PRESSURE: 153 MMHG | RESPIRATION RATE: 18 BRPM | BODY MASS INDEX: 25.06 KG/M2 | WEIGHT: 185 LBS | HEIGHT: 72 IN | OXYGEN SATURATION: 98 %

## 2025-02-27 DIAGNOSIS — F10.929 ALCOHOLIC INTOXICATION WITH COMPLICATION: Primary | ICD-10-CM

## 2025-02-27 LAB
ALBUMIN SERPL BCP-MCNC: 4.2 G/DL (ref 3.5–5.2)
ALP SERPL-CCNC: 136 U/L (ref 40–150)
ALT SERPL W/O P-5'-P-CCNC: 113 U/L (ref 10–44)
AMPHET+METHAMPHET UR QL: ABNORMAL
ANION GAP SERPL CALC-SCNC: 20 MMOL/L (ref 8–16)
APAP SERPL-MCNC: <3 UG/ML (ref 10–20)
AST SERPL-CCNC: 212 U/L (ref 10–40)
BACTERIA #/AREA URNS AUTO: ABNORMAL /HPF
BARBITURATES UR QL SCN>200 NG/ML: NEGATIVE
BASOPHILS # BLD AUTO: 0.07 K/UL (ref 0–0.2)
BASOPHILS NFR BLD: 1.3 % (ref 0–1.9)
BENZODIAZ UR QL SCN>200 NG/ML: NEGATIVE
BILIRUB SERPL-MCNC: 0.6 MG/DL (ref 0.1–1)
BILIRUB UR QL STRIP: NEGATIVE
BUN SERPL-MCNC: 9 MG/DL (ref 6–20)
BZE UR QL SCN: NEGATIVE
CALCIUM SERPL-MCNC: 9.2 MG/DL (ref 8.7–10.5)
CANNABINOIDS UR QL SCN: NEGATIVE
CHLORIDE SERPL-SCNC: 102 MMOL/L (ref 95–110)
CLARITY UR REFRACT.AUTO: CLEAR
CO2 SERPL-SCNC: 18 MMOL/L (ref 23–29)
COLOR UR AUTO: YELLOW
CREAT SERPL-MCNC: 0.8 MG/DL (ref 0.5–1.4)
CREAT UR-MCNC: 36 MG/DL (ref 23–375)
DIFFERENTIAL METHOD BLD: ABNORMAL
EOSINOPHIL # BLD AUTO: 0 K/UL (ref 0–0.5)
EOSINOPHIL NFR BLD: 0.4 % (ref 0–8)
ERYTHROCYTE [DISTWIDTH] IN BLOOD BY AUTOMATED COUNT: 13 % (ref 11.5–14.5)
EST. GFR  (NO RACE VARIABLE): >60 ML/MIN/1.73 M^2
ETHANOL SERPL-MCNC: 234 MG/DL
ETHANOL SERPL-MCNC: >450 MG/DL
GLUCOSE SERPL-MCNC: 104 MG/DL (ref 70–110)
GLUCOSE UR QL STRIP: NEGATIVE
HCT VFR BLD AUTO: 41.6 % (ref 40–54)
HCV AB SERPL QL IA: REACTIVE
HGB BLD-MCNC: 14.1 G/DL (ref 14–18)
HGB UR QL STRIP: NEGATIVE
HIV 1+2 AB+HIV1 P24 AG SERPL QL IA: NORMAL
HYALINE CASTS UR QL AUTO: 0 /LPF
IMM GRANULOCYTES # BLD AUTO: 0.01 K/UL (ref 0–0.04)
IMM GRANULOCYTES NFR BLD AUTO: 0.2 % (ref 0–0.5)
KETONES UR QL STRIP: NEGATIVE
LEUKOCYTE ESTERASE UR QL STRIP: ABNORMAL
LYMPHOCYTES # BLD AUTO: 2.8 K/UL (ref 1–4.8)
LYMPHOCYTES NFR BLD: 50.7 % (ref 18–48)
MCH RBC QN AUTO: 32.6 PG (ref 27–31)
MCHC RBC AUTO-ENTMCNC: 33.9 G/DL (ref 32–36)
MCV RBC AUTO: 96 FL (ref 82–98)
METHADONE UR QL SCN>300 NG/ML: NEGATIVE
MICROSCOPIC COMMENT: ABNORMAL
MONOCYTES # BLD AUTO: 0.6 K/UL (ref 0.3–1)
MONOCYTES NFR BLD: 10.5 % (ref 4–15)
NEUTROPHILS # BLD AUTO: 2 K/UL (ref 1.8–7.7)
NEUTROPHILS NFR BLD: 36.9 % (ref 38–73)
NITRITE UR QL STRIP: NEGATIVE
NRBC BLD-RTO: 0 /100 WBC
OPIATES UR QL SCN: NEGATIVE
PCP UR QL SCN>25 NG/ML: NEGATIVE
PH UR STRIP: 7 [PH] (ref 5–8)
PLATELET # BLD AUTO: 225 K/UL (ref 150–450)
PMV BLD AUTO: 11 FL (ref 9.2–12.9)
POTASSIUM SERPL-SCNC: 3.5 MMOL/L (ref 3.5–5.1)
PROT SERPL-MCNC: 8.6 G/DL (ref 6–8.4)
PROT UR QL STRIP: ABNORMAL
RBC # BLD AUTO: 4.32 M/UL (ref 4.6–6.2)
RBC #/AREA URNS AUTO: 5 /HPF (ref 0–4)
SALICYLATES SERPL-MCNC: <5 MG/DL (ref 15–30)
SODIUM SERPL-SCNC: 140 MMOL/L (ref 136–145)
SP GR UR STRIP: 1 (ref 1–1.03)
SQUAMOUS #/AREA URNS AUTO: 0 /HPF
TOXICOLOGY INFORMATION: ABNORMAL
TSH SERPL DL<=0.005 MIU/L-ACNC: 1.49 UIU/ML (ref 0.4–4)
URN SPEC COLLECT METH UR: ABNORMAL
WBC # BLD AUTO: 5.52 K/UL (ref 3.9–12.7)
WBC #/AREA URNS AUTO: 7 /HPF (ref 0–5)

## 2025-02-27 PROCEDURE — 81001 URINALYSIS AUTO W/SCOPE: CPT | Mod: XB

## 2025-02-27 PROCEDURE — 80053 COMPREHEN METABOLIC PANEL: CPT

## 2025-02-27 PROCEDURE — 87389 HIV-1 AG W/HIV-1&-2 AB AG IA: CPT | Performed by: EMERGENCY MEDICINE

## 2025-02-27 PROCEDURE — 82077 ASSAY SPEC XCP UR&BREATH IA: CPT | Mod: 91 | Performed by: EMERGENCY MEDICINE

## 2025-02-27 PROCEDURE — 84443 ASSAY THYROID STIM HORMONE: CPT

## 2025-02-27 PROCEDURE — 87522 HEPATITIS C REVRS TRNSCRPJ: CPT | Performed by: EMERGENCY MEDICINE

## 2025-02-27 PROCEDURE — 82077 ASSAY SPEC XCP UR&BREATH IA: CPT

## 2025-02-27 PROCEDURE — 96372 THER/PROPH/DIAG INJ SC/IM: CPT

## 2025-02-27 PROCEDURE — 63600175 PHARM REV CODE 636 W HCPCS

## 2025-02-27 PROCEDURE — 80179 DRUG ASSAY SALICYLATE: CPT

## 2025-02-27 PROCEDURE — 85025 COMPLETE CBC W/AUTO DIFF WBC: CPT

## 2025-02-27 PROCEDURE — 80307 DRUG TEST PRSMV CHEM ANLYZR: CPT

## 2025-02-27 PROCEDURE — 80143 DRUG ASSAY ACETAMINOPHEN: CPT

## 2025-02-27 PROCEDURE — 86803 HEPATITIS C AB TEST: CPT | Performed by: EMERGENCY MEDICINE

## 2025-02-27 PROCEDURE — 96374 THER/PROPH/DIAG INJ IV PUSH: CPT

## 2025-02-27 PROCEDURE — 99284 EMERGENCY DEPT VISIT MOD MDM: CPT | Mod: 25

## 2025-02-27 RX ORDER — HALOPERIDOL 5 MG/ML
5 INJECTION INTRAMUSCULAR
Status: COMPLETED | OUTPATIENT
Start: 2025-02-27 | End: 2025-02-27

## 2025-02-27 RX ORDER — DIPHENHYDRAMINE HYDROCHLORIDE 50 MG/ML
50 INJECTION INTRAMUSCULAR; INTRAVENOUS
Status: COMPLETED | OUTPATIENT
Start: 2025-02-27 | End: 2025-02-27

## 2025-02-27 RX ORDER — MIDAZOLAM HYDROCHLORIDE 1 MG/ML
2 INJECTION, SOLUTION INTRAMUSCULAR; INTRAVENOUS
Status: COMPLETED | OUTPATIENT
Start: 2025-02-27 | End: 2025-02-27

## 2025-02-27 RX ORDER — HALOPERIDOL 5 MG/ML
INJECTION INTRAMUSCULAR
Status: COMPLETED
Start: 2025-02-27 | End: 2025-02-27

## 2025-02-27 RX ADMIN — HALOPERIDOL 5 MG: 5 INJECTION INTRAMUSCULAR at 10:02

## 2025-02-27 RX ADMIN — HALOPERIDOL LACTATE 5 MG: 5 INJECTION, SOLUTION INTRAMUSCULAR at 03:02

## 2025-02-27 RX ADMIN — MIDAZOLAM 2 MG: 1 INJECTION INTRAMUSCULAR; INTRAVENOUS at 04:02

## 2025-02-27 RX ADMIN — DIPHENHYDRAMINE HYDROCHLORIDE 50 MG: 50 INJECTION, SOLUTION INTRAMUSCULAR; INTRAVENOUS at 02:02

## 2025-02-27 RX ADMIN — SODIUM CHLORIDE, POTASSIUM CHLORIDE, SODIUM LACTATE AND CALCIUM CHLORIDE 1000 ML: 600; 310; 30; 20 INJECTION, SOLUTION INTRAVENOUS at 05:02

## 2025-02-27 RX ADMIN — HALOPERIDOL LACTATE 5 MG: 5 INJECTION, SOLUTION INTRAMUSCULAR at 10:02

## 2025-02-27 NOTE — ED NOTES
Pt belongings: shirt, pants, shoes, and socks. All belongings secured in the closet. No valuables in the safe.

## 2025-02-27 NOTE — ED NOTES
Pt remains in paper scrubs, resting comfortably in stretcher. NAD. Pt awake, drinking water, and asking the same questions over and over. JASON Marie attempting to reorient him. Pt aware of plan of care. PEC complete and in patient's chart. Pt belongings are removed from room, labeled, and locked away. No unnecessary equipment, cords, or wires left in room. Sitter at bedside recording Q 15 minute checks. Sitter belongings are out of room.

## 2025-02-27 NOTE — ED NOTES
Pt became agitated. He jumped out of bed began cursing, pacing and lunging at staff. Security and charge RN at bedside along with this RN and JASON Marie. Pt threatening, cursing, and lunging at security guards. Is ignoring RN's request to sit down on the stretcher and calm down. Dr Gee ordered medication to help manage pt behavior.

## 2025-02-27 NOTE — ED NOTES
Pt becoming agitated, he jumped up off of the stretcher demanding his clothes and threatening staff. Panic alarm pressed security at the bedside. Pt difficult to redirect. He is not following directions and is refusing to return to the stretcher. Pt informed of his PEC status, he is not able to comprehend the situation. MD notified, medication ordered. Pt assisted back to the stretcher by ED and security staff.

## 2025-02-27 NOTE — ED PROVIDER NOTES
"Encounter Date: 2/27/2025       History     Chief Complaint   Patient presents with    Alcohol Intoxication     Brought in by sister; intoxicated. Patient combative on arrival     Kevin Lindo is a 39-year-old male with past history of suicide attempt via medication and alcohol use disorder presents to the ED with acute agitation and alcohol intoxication.  Upon presentation to the hospital, patient is acutely agitated in the waiting room and combative with security staff.  Patient stating "F**k the police" and that he does not know why he was brought to the ED.     Collateral: Sister called for additional information and states that he was drinking last night and stating that he was going to check himself into a rehab facility today.  Patient's sister went over to hang out with him and they ended up going to a bar where patient continued to drink.  Sister lost track of patient at 1 point and was notified that he was out lying down in the street.  Upon waking him, patient was acting very different, stating he was slightly more aggressive and agitated.  Patient was brought to a rehab facility and started the process of being checked in, but became acutely agitated and combative with a member of the staff and was subsequently kicked out.  Patient's sister initially started bringing him to [facility], but patient became aggressive with her, which is very atypical for him.  This prompted her to bring him to the ED.    Sister notes a past history of multiple rehab stays for alcohol use disorder and previous suicide attempts via intentional ingestion of medication.    Upon re-evaluation after sedation, patient stating that he had been drinking and had used meth roughly 2-3 hours prior to arrival.  Patient denying SI, HI, AVH.        Review of patient's allergies indicates:  No Known Allergies  Past Medical History:   Diagnosis Date    H/O hernia repair      Past Surgical History:   Procedure Laterality Date    " "HERNIA REPAIR       Family History   Problem Relation Name Age of Onset    No Known Problems Mother      No Known Problems Father       Social History[1]  Review of Systems  10-point Review of Systems was performed and is negative/non-contributory unless otherwise stated in above HPI.    Physical Exam     Initial Vitals [02/27/25 1134]   BP Pulse Resp Temp SpO2   120/75 109 16 98.1 °F (36.7 °C) (!) 94 %      MAP       --         Physical Exam    Vitals reviewed.  Constitutional: He appears well-developed and well-nourished. He is not diaphoretic. He is uncooperative. No distress. He is restrained.   HENT:   Head: Normocephalic and atraumatic. Mouth/Throat: Oropharynx is clear and moist.   Eyes: Conjunctivae and EOM are normal. Pupils are equal, round, and reactive to light.   Neck: Neck supple. No tracheal deviation present. No JVD present.   Normal range of motion.  Cardiovascular:  Normal rate, regular rhythm, normal heart sounds and intact distal pulses.     Exam reveals no gallop and no friction rub.       No murmur heard.  Pulmonary/Chest: Breath sounds normal. No stridor. No respiratory distress. He has no wheezes. He has no rhonchi. He has no rales.   Abdominal: Abdomen is soft. Bowel sounds are normal. There is no abdominal tenderness. There is no rebound.   Musculoskeletal:         General: Normal range of motion.      Cervical back: Normal range of motion and neck supple.     Neurological: He has normal strength.   Skin: Skin is warm and dry.     Mental Status Exam  General/Constitutional: Appears stated age. Appropriate hygiene and grooming. In no apparent distress.  Eye Contact: Good eye contact  Behavior: Calm. Minimally cooperative with interview.  Speech: Normal rate. Yelling. Angry tone. Non-pressured. Good articulation. Spontaneous.  Mood: "How am I feeling? Man, f**k the police."  Affect: Agitated; mood-congruent.  Thought Process: Superficially linear  Thought Content: Denies SI. Denies " "HI  Perception: Denies AVH. Does not appear to be RIS. No evidence of paranoia or delusions".  Insight: Poor  Judgement: Poor      ED Course   Procedures  Labs Reviewed   HEPATITIS C ANTIBODY - Abnormal       Result Value    Hepatitis C Ab Reactive (*)     Narrative:     Release to patient->Immediate   CBC W/ AUTO DIFFERENTIAL - Abnormal    WBC 5.52      RBC 4.32 (*)     Hemoglobin 14.1      Hematocrit 41.6      MCV 96      MCH 32.6 (*)     MCHC 33.9      RDW 13.0      Platelets 225      MPV 11.0      Immature Granulocytes 0.2      Gran # (ANC) 2.0      Immature Grans (Abs) 0.01      Lymph # 2.8      Mono # 0.6      Eos # 0.0      Baso # 0.07      nRBC 0      Gran % 36.9 (*)     Lymph % 50.7 (*)     Mono % 10.5      Eosinophil % 0.4      Basophil % 1.3      Differential Method Automated      Narrative:     Release to patient->Immediate   COMPREHENSIVE METABOLIC PANEL - Abnormal    Sodium 140      Potassium 3.5      Chloride 102      CO2 18 (*)     Glucose 104      BUN 9      Creatinine 0.8      Calcium 9.2      Total Protein 8.6 (*)     Albumin 4.2      Total Bilirubin 0.6      Alkaline Phosphatase 136       (*)      (*)     eGFR >60.0      Anion Gap 20 (*)     Narrative:     Release to patient->Immediate   URINALYSIS, REFLEX TO URINE CULTURE - Abnormal    Specimen UA Urine, Clean Catch      Color, UA Yellow      Appearance, UA Clear      pH, UA 7.0      Specific Gravity, UA 1.005      Protein, UA 1+ (*)     Glucose, UA Negative      Ketones, UA Negative      Bilirubin (UA) Negative      Occult Blood UA Negative      Nitrite, UA Negative      Leukocytes, UA 1+ (*)     Narrative:     Specimen Source->Urine   DRUG SCREEN PANEL, URINE EMERGENCY - Abnormal    Benzodiazepines Negative      Methadone metabolites Negative      Cocaine (Metab.) Negative      Opiate Scrn, Ur Negative      Barbiturate Screen, Ur Negative      Amphetamine Screen, Ur Presumptive Positive (*)     THC Negative      Phencyclidine " Negative      Creatinine, Urine 36.0      Toxicology Information SEE COMMENT      Narrative:     Specimen Source->Urine   ACETAMINOPHEN LEVEL - Abnormal    Acetaminophen (Tylenol), Serum <3.0 (*)     Narrative:     Release to patient->Immediate   SALICYLATE LEVEL - Abnormal    Salicylate Lvl <5.0 (*)     Narrative:     Release to patient->Immediate   ALCOHOL,MEDICAL (ETHANOL) - Abnormal    Alcohol, Serum >450 (*)     Narrative:     Release to patient->Immediate   URINALYSIS MICROSCOPIC - Abnormal    RBC, UA 5 (*)     WBC, UA 7 (*)     Bacteria Few (*)     Squam Epithel, UA 0      Hyaline Casts, UA 0      Microscopic Comment SEE COMMENT      Narrative:     Specimen Source->Urine   HIV 1 / 2 ANTIBODY    HIV 1/2 Ag/Ab Non-reactive      Narrative:     Release to patient->Immediate   TSH    TSH 1.486      Narrative:     Release to patient->Immediate   HEPATITIS C RNA, QUANTITATIVE, PCR          Imaging Results              CT Head Without Contrast (Final result)  Result time 02/27/25 11:47:53      Final result by Harley Jane MD (02/27/25 11:47:53)                   Impression:      No evidence of acute intracranial hemorrhage.      Electronically signed by: Harley aJne MD  Date:    02/27/2025  Time:    11:47               Narrative:    EXAMINATION:  CT HEAD WITHOUT CONTRAST    CLINICAL HISTORY:  Head trauma, abnormal mental status (Age 19-64y);    TECHNIQUE:  Low dose axial CT images obtained throughout the head without the use of intravenous contrast.  Axial, sagittal and coronal reconstructions were performed.    COMPARISON:  None.    FINDINGS:  Intracranial compartment:    Ventricles and sulci are normal in size for age without evidence of hydrocephalus.    The brain parenchyma appears within normal limits.  No parenchymal  hemorrhage, edema, mass effect or major vascular distribution infarct.    No extra-axial blood or fluid collections.    Skull/extracranial contents (limited evaluation):    No displaced  calvarial fracture.    The mastoid air cells and visualized paranasal sinuses are essentially clear.                                       Medications   haloperidol lactate injection 5 mg (has no administration in time range)   diphenhydrAMINE injection 50 mg (has no administration in time range)   haloperidol lactate injection 5 mg (5 mg Intramuscular Given 2/27/25 1050)     Medical Decision Making  Kevin Lindo is a 39 y.o. male with a past medical history of previous suicide attempts and of the use disorder presenting to the ED with acute agitation and intoxication. History and physical exam as above. Initial vital signs stable and non-actionable. Initial work-up to include: Labs (CBC, CMP, UA, UDS, TSH, Tylenol/salicylate/ethanol level), Imaging (CT Head,), Haldol 5 mg IM    Differential diagnosis for this patient includes, but is not limited to:  Acute intoxication versus substance induced psychosis (less likely given lack of disorganized thinking and patient is redirectable), potential intentional overdose/ingestion.      Results of workup include significantly elevated ethanol level and UDS positive for amphetamines, consistent with patient's collateral history.  This raises suspicion for acute intoxication.  Following Haldol administration, patient's agitation much improved, patient is now appearing grossly intoxicated but is redirectable and no longer aggressive.  CT head unremarkable for intracranial abnormality or acute bony injury.    At time of patient handoff, patient is stable, currently pending clinical sobriety and re-evaluation.  Care of patient handed off to oncoming physician, Dr Zi Gee and Dr Jhonatan Jones.        Amount and/or Complexity of Data Reviewed  Labs: ordered.  Radiology: ordered.    Risk  Prescription drug management.    Signed,    Antonio Sotelo MD  Emergency Medicine, PGY-1  [facility]                                Restraint Note          /89   Pulse 107   Temp 98.1 °F (36.7 °C)   Resp 18   Ht 6' (1.829 m)   Wt 83.9 kg (185 lb)   SpO2 95%   BMI 25.09 kg/m²     Time: 2:55 PM    Patient's immediate situation: Agitation, combative with staff    Reaction to intervention: Patient sedated, on revaluation patient is more pleasant, cooperative, and redirectable    Medications/behavioral condition: Acute agitation and intoxication    Restraint status: Re-evaluated and no longer necessary following initial evaluation. Discontinued    Clinical Impression:  Final diagnoses:  [F10.929] Alcoholic intoxication with complication (Primary)                     [1]   Social History  Tobacco Use    Smoking status: Former    Smokeless tobacco: Never   Substance Use Topics    Alcohol use: Yes    Drug use: Yes     Types: Marijuana     Comment: heroine  Last use was 4/06/2020        Antonio Sotelo MD  Resident  02/27/25 1609
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
## 2025-02-27 NOTE — ED NOTES
Reached out to team to ask for some medication to help pt calm down, he keeps getting up, he is confused, and having increased agitation. The pt is at high risk of falling. Sitter having a hard time keeping pt in the bed. Dr. Jones states he will come see pt.

## 2025-02-27 NOTE — ED NOTES
Pt remains in paper scrubs, resting comfortably in stretcher with eyes closed. Railes up x2. NAD. Pt aware of plan of care. PEC complete and in patient's chart. Pt belongings are removed from room, labeled, and locked away. No unnecessary equipment, cords, or wires left in room. Sitter at bedside recording Q 15 minute checks. Sitter belongings are out of room.

## 2025-02-28 LAB
HCV RNA SERPL NAA+PROBE-LOG IU: 5.33 LOGIU/ML
HCV RNA SERPL QL NAA+PROBE: DETECTED
HCV RNA SPEC NAA+PROBE-ACNC: ABNORMAL IU/ML

## 2025-02-28 NOTE — ED NOTES
The patient is escorted to & from the restroom where he voided. He returned to bed while watching TV. DVC maintained for safety.

## 2025-02-28 NOTE — PROVIDER PROGRESS NOTES - EMERGENCY DEPT.
Encounter Date: 2/27/2025    ED Physician Progress Notes          ED PHYSICIAN HAND-OFF NOTE:  8:59PM  2/27/2025  Kevin Lindo is a 39 y.o. male who presented to the ED on 2/27/2025 and has been managed by Dr. Smith, who reports patient C/O alcohol and methamphetamine intoxication. I assumed care of patient from off-going ED physician team at 2:00 PM pending alcohol metabolism and reassess.     Throughout course patient required multiple medications to help calm him down. Patient frequently reassessed to ensure safety of patient and staff. When patient demonstrated he could control himself, walk with a steady gait, and felt safe going home he was discharged. At the time of discharge, the patient demonstrated no threat to himself or others, had no visual or auditory hallucinations, had good judgement, insight, and linear thought content.    On my evaluation, Kevin Lindo appears well, hemodynamically stable and in NAD. Thus far, Kevin Lindo has received:  Medications   haloperidol lactate injection 5 mg (5 mg Intramuscular Given 2/27/25 1050)   haloperidol lactate injection 5 mg (5 mg Intramuscular Given 2/27/25 1500)   diphenhydrAMINE injection 50 mg (50 mg Intravenous Given 2/27/25 1459)   midazolam injection 2 mg (2 mg Intramuscular Given 2/27/25 1639)   lactated ringers bolus 1,000 mL (0 mLs Intravenous Stopped 2/27/25 1834)       On my exam, I appreciate:  BP (!) 153/102 (BP Location: Right arm, Patient Position: Sitting)   Pulse (!) 115   Temp 97.9 °F (36.6 °C) (Oral)   Resp 18   Ht 6' (1.829 m)   Wt 83.9 kg (185 lb)   SpO2 98%   BMI 25.09 kg/m²   Constitutional: Well-appearing; Non-Toxic-appearing and in NAD.  Head/Face: AT/NC & No facial asymmetry.  Oropharynx: Speaking Full Sentences with no drooling or slurring of speech.  Cardiovascular: RRR with no m/r/g  Pulmonary/Chest: CTAB  Abdominal: Soft, ND, NT.  Musculoskeletal: FROM, NML Gait.  Neuro/Psych: Calm;  Cooperative, Following Command, Answering Questions Appropriately, and No Gait/Balance deficits.         Disposition: Discharged home  I have discussed and counseled Kevin Lindo regarding exam, results, diagnosis, treatment, and plan.  ____________________  Jhonatan Jones MD  Emergency Medicine Resident  8:59 PM 2/27/2025

## 2025-02-28 NOTE — ED NOTES
Dr Oglesby notified of patient's B/P, no orders given. The patient is dressed in his civilian clothes. He offers to pay for a [employer]. He is escorted to the ED lobby where he will wait for the cab. All belongings returned to the patient. He did not state that anyone should be notified of his discharge. He states that he & his sister had an argument & he doesn't want to call her for a ride. Gait is steady. Patient verbalized appreciation of the help he received.

## 2025-02-28 NOTE — ED NOTES
"PEC criteria explained patient as he appears surprised about being PEC"d & states that he didn't try to kill himself. He is explained that SI is not the only reason for a PEC. Patient acknowledged understanding of the explanation. DVC is maintained for safety.  "

## 2025-03-05 ENCOUNTER — HOSPITAL ENCOUNTER (EMERGENCY)
Facility: HOSPITAL | Age: 40
Discharge: NURSING, REHAB OR LONG-TERM CARE | End: 2025-03-05
Attending: STUDENT IN AN ORGANIZED HEALTH CARE EDUCATION/TRAINING PROGRAM

## 2025-03-05 VITALS
OXYGEN SATURATION: 98 % | SYSTOLIC BLOOD PRESSURE: 138 MMHG | HEIGHT: 72 IN | TEMPERATURE: 98 F | RESPIRATION RATE: 16 BRPM | WEIGHT: 185 LBS | HEART RATE: 89 BPM | DIASTOLIC BLOOD PRESSURE: 90 MMHG | BODY MASS INDEX: 25.06 KG/M2

## 2025-03-05 DIAGNOSIS — N39.0 URINARY TRACT INFECTION WITHOUT HEMATURIA, SITE UNSPECIFIED: ICD-10-CM

## 2025-03-05 DIAGNOSIS — F10.920 ALCOHOLIC INTOXICATION WITHOUT COMPLICATION: Primary | ICD-10-CM

## 2025-03-05 DIAGNOSIS — F10.10 ETOH ABUSE: ICD-10-CM

## 2025-03-05 LAB
ALBUMIN SERPL BCP-MCNC: 4.1 G/DL (ref 3.5–5.2)
ALP SERPL-CCNC: 120 U/L (ref 40–150)
ALT SERPL W/O P-5'-P-CCNC: 95 U/L (ref 10–44)
AMPHET+METHAMPHET UR QL: NEGATIVE
ANION GAP SERPL CALC-SCNC: 20 MMOL/L (ref 8–16)
AST SERPL-CCNC: 210 U/L (ref 10–40)
BACTERIA #/AREA URNS AUTO: ABNORMAL /HPF
BARBITURATES UR QL SCN>200 NG/ML: NEGATIVE
BASOPHILS # BLD AUTO: 0.04 K/UL (ref 0–0.2)
BASOPHILS NFR BLD: 1 % (ref 0–1.9)
BENZODIAZ UR QL SCN>200 NG/ML: NEGATIVE
BILIRUB SERPL-MCNC: 0.9 MG/DL (ref 0.1–1)
BILIRUB UR QL STRIP: ABNORMAL
BUN SERPL-MCNC: 12 MG/DL (ref 6–20)
BZE UR QL SCN: NEGATIVE
CALCIUM SERPL-MCNC: 8.7 MG/DL (ref 8.7–10.5)
CANNABINOIDS UR QL SCN: NEGATIVE
CHLORIDE SERPL-SCNC: 103 MMOL/L (ref 95–110)
CLARITY UR REFRACT.AUTO: ABNORMAL
CO2 SERPL-SCNC: 18 MMOL/L (ref 23–29)
COLOR UR AUTO: YELLOW
CREAT SERPL-MCNC: 0.8 MG/DL (ref 0.5–1.4)
CREAT UR-MCNC: 357 MG/DL (ref 23–375)
DIFFERENTIAL METHOD BLD: ABNORMAL
EOSINOPHIL # BLD AUTO: 0 K/UL (ref 0–0.5)
EOSINOPHIL NFR BLD: 0.3 % (ref 0–8)
ERYTHROCYTE [DISTWIDTH] IN BLOOD BY AUTOMATED COUNT: 12.8 % (ref 11.5–14.5)
EST. GFR  (NO RACE VARIABLE): >60 ML/MIN/1.73 M^2
ETHANOL SERPL-MCNC: 147 MG/DL
ETHANOL SERPL-MCNC: 194 MG/DL
ETHANOL SERPL-MCNC: 299 MG/DL
GLUCOSE SERPL-MCNC: 157 MG/DL (ref 70–110)
GLUCOSE UR QL STRIP: NEGATIVE
HCT VFR BLD AUTO: 40.1 % (ref 40–54)
HCV AB SERPL QL IA: REACTIVE
HGB BLD-MCNC: 13.9 G/DL (ref 14–18)
HGB UR QL STRIP: ABNORMAL
HIV 1+2 AB+HIV1 P24 AG SERPL QL IA: NORMAL
HYALINE CASTS UR QL AUTO: 0 /LPF
IMM GRANULOCYTES # BLD AUTO: 0 K/UL (ref 0–0.04)
IMM GRANULOCYTES NFR BLD AUTO: 0 % (ref 0–0.5)
KETONES UR QL STRIP: ABNORMAL
LEUKOCYTE ESTERASE UR QL STRIP: ABNORMAL
LYMPHOCYTES # BLD AUTO: 1.8 K/UL (ref 1–4.8)
LYMPHOCYTES NFR BLD: 44.5 % (ref 18–48)
MCH RBC QN AUTO: 32.8 PG (ref 27–31)
MCHC RBC AUTO-ENTMCNC: 34.7 G/DL (ref 32–36)
MCV RBC AUTO: 95 FL (ref 82–98)
METHADONE UR QL SCN>300 NG/ML: NEGATIVE
MICROSCOPIC COMMENT: ABNORMAL
MONOCYTES # BLD AUTO: 0.3 K/UL (ref 0.3–1)
MONOCYTES NFR BLD: 8.1 % (ref 4–15)
NEUTROPHILS # BLD AUTO: 1.8 K/UL (ref 1.8–7.7)
NEUTROPHILS NFR BLD: 46.1 % (ref 38–73)
NITRITE UR QL STRIP: NEGATIVE
NRBC BLD-RTO: 0 /100 WBC
OPIATES UR QL SCN: NEGATIVE
PCP UR QL SCN>25 NG/ML: NEGATIVE
PH UR STRIP: 7 [PH] (ref 5–8)
PLATELET # BLD AUTO: 148 K/UL (ref 150–450)
PMV BLD AUTO: 11.5 FL (ref 9.2–12.9)
POTASSIUM SERPL-SCNC: 3.5 MMOL/L (ref 3.5–5.1)
PROT SERPL-MCNC: 8.1 G/DL (ref 6–8.4)
PROT UR QL STRIP: ABNORMAL
RBC # BLD AUTO: 4.24 M/UL (ref 4.6–6.2)
RBC #/AREA URNS AUTO: 68 /HPF (ref 0–4)
SODIUM SERPL-SCNC: 141 MMOL/L (ref 136–145)
SP GR UR STRIP: 1.03 (ref 1–1.03)
TOXICOLOGY INFORMATION: NORMAL
URN SPEC COLLECT METH UR: ABNORMAL
WBC # BLD AUTO: 3.93 K/UL (ref 3.9–12.7)
WBC #/AREA URNS AUTO: >100 /HPF (ref 0–5)

## 2025-03-05 PROCEDURE — 87389 HIV-1 AG W/HIV-1&-2 AB AG IA: CPT

## 2025-03-05 PROCEDURE — 96376 TX/PRO/DX INJ SAME DRUG ADON: CPT

## 2025-03-05 PROCEDURE — 63600175 PHARM REV CODE 636 W HCPCS

## 2025-03-05 PROCEDURE — 80307 DRUG TEST PRSMV CHEM ANLYZR: CPT

## 2025-03-05 PROCEDURE — 85025 COMPLETE CBC W/AUTO DIFF WBC: CPT

## 2025-03-05 PROCEDURE — 96374 THER/PROPH/DIAG INJ IV PUSH: CPT

## 2025-03-05 PROCEDURE — 87186 SC STD MICRODIL/AGAR DIL: CPT

## 2025-03-05 PROCEDURE — 87086 URINE CULTURE/COLONY COUNT: CPT

## 2025-03-05 PROCEDURE — 80053 COMPREHEN METABOLIC PANEL: CPT

## 2025-03-05 PROCEDURE — 81001 URINALYSIS AUTO W/SCOPE: CPT | Mod: XB

## 2025-03-05 PROCEDURE — 96361 HYDRATE IV INFUSION ADD-ON: CPT

## 2025-03-05 PROCEDURE — 82077 ASSAY SPEC XCP UR&BREATH IA: CPT | Mod: 91

## 2025-03-05 PROCEDURE — 25000003 PHARM REV CODE 250

## 2025-03-05 PROCEDURE — 82077 ASSAY SPEC XCP UR&BREATH IA: CPT | Mod: 91 | Performed by: STUDENT IN AN ORGANIZED HEALTH CARE EDUCATION/TRAINING PROGRAM

## 2025-03-05 PROCEDURE — 25000003 PHARM REV CODE 250: Performed by: STUDENT IN AN ORGANIZED HEALTH CARE EDUCATION/TRAINING PROGRAM

## 2025-03-05 PROCEDURE — 87088 URINE BACTERIA CULTURE: CPT

## 2025-03-05 PROCEDURE — 99285 EMERGENCY DEPT VISIT HI MDM: CPT | Mod: 25

## 2025-03-05 PROCEDURE — 87522 HEPATITIS C REVRS TRNSCRPJ: CPT

## 2025-03-05 PROCEDURE — 86803 HEPATITIS C AB TEST: CPT

## 2025-03-05 RX ORDER — NITROFURANTOIN 25; 75 MG/1; MG/1
100 CAPSULE ORAL
Status: COMPLETED | OUTPATIENT
Start: 2025-03-05 | End: 2025-03-05

## 2025-03-05 RX ORDER — ONDANSETRON 8 MG/1
8 TABLET, ORALLY DISINTEGRATING ORAL
Status: COMPLETED | OUTPATIENT
Start: 2025-03-05 | End: 2025-03-05

## 2025-03-05 RX ORDER — LORAZEPAM 2 MG/ML
1 INJECTION INTRAMUSCULAR
Status: COMPLETED | OUTPATIENT
Start: 2025-03-05 | End: 2025-03-05

## 2025-03-05 RX ORDER — LORAZEPAM 2 MG/ML
2 INJECTION INTRAMUSCULAR
Status: DISCONTINUED | OUTPATIENT
Start: 2025-03-05 | End: 2025-03-05 | Stop reason: HOSPADM

## 2025-03-05 RX ORDER — ONDANSETRON 4 MG/1
4 TABLET, ORALLY DISINTEGRATING ORAL
Status: COMPLETED | OUTPATIENT
Start: 2025-03-05 | End: 2025-03-05

## 2025-03-05 RX ORDER — NITROFURANTOIN 25; 75 MG/1; MG/1
100 CAPSULE ORAL
Status: DISCONTINUED | OUTPATIENT
Start: 2025-03-05 | End: 2025-03-05

## 2025-03-05 RX ORDER — LORAZEPAM 2 MG/ML
2 INJECTION INTRAMUSCULAR
Status: COMPLETED | OUTPATIENT
Start: 2025-03-05 | End: 2025-03-05

## 2025-03-05 RX ADMIN — SODIUM CHLORIDE 1000 ML: 9 INJECTION, SOLUTION INTRAVENOUS at 12:03

## 2025-03-05 RX ADMIN — LORAZEPAM 1 MG: 2 INJECTION INTRAMUSCULAR; INTRAVENOUS at 11:03

## 2025-03-05 RX ADMIN — ONDANSETRON 4 MG: 4 TABLET, ORALLY DISINTEGRATING ORAL at 11:03

## 2025-03-05 RX ADMIN — NITROFURANTOIN MONOHYDRATE/MACROCRYSTALS 100 MG: 25; 75 CAPSULE ORAL at 12:03

## 2025-03-05 RX ADMIN — SODIUM CHLORIDE 1000 ML: 9 INJECTION, SOLUTION INTRAVENOUS at 11:03

## 2025-03-05 RX ADMIN — ONDANSETRON 8 MG: 8 TABLET, ORALLY DISINTEGRATING ORAL at 02:03

## 2025-03-05 RX ADMIN — LORAZEPAM 2 MG: 2 INJECTION INTRAMUSCULAR; INTRAVENOUS at 02:03

## 2025-03-05 NOTE — ED PROVIDER NOTES
Encounter Date: 3/5/2025       History     Chief Complaint   Patient presents with    Alcohol Intoxication     EMS reports patient +ETOH upon attempt to get admitted to [facility]     40 YO male with PMH significant for alcohol use disorder, polysubstance abuse and recent admission for suicide attempt presents to the ED for alcohol intoxication. Patient states that he has been drinking every day for the last 2 months and admits to drinking a liter of vodka this morning around 6 AM. He attempted to go to [facility] rehab but was told to come to the ED after a positive brethalyzer test at check in. He is still interested in going to rehab for treatment upon discharge. He is acutely agitated and tremulous and is unable to sit still during the interview. He denies any active hallucinations, suicidal or homidical ideation. He denies fever, chills, blurred vision, chest pain, shortness of breath, abdominal pain or changes in bowel or bladder habits.         Review of patient's allergies indicates:  No Known Allergies  Past Medical History:   Diagnosis Date    H/O hernia repair      Past Surgical History:   Procedure Laterality Date    HERNIA REPAIR       Family History   Problem Relation Name Age of Onset    No Known Problems Mother      No Known Problems Father       Social History[1]  Review of Systems   Neurological:  Positive for tremors.   Psychiatric/Behavioral:  Positive for agitation and behavioral problems. The patient is hyperactive.    All other systems reviewed and are negative.      Physical Exam     Initial Vitals [03/05/25 1018]   BP Pulse Resp Temp SpO2   (!) 160/110 77 18 97.8 °F (36.6 °C) 98 %      MAP       --         Physical Exam    Vitals reviewed.  Constitutional: He is active. He has a sickly appearance.   HENT:   Head: Normocephalic and atraumatic.   Eyes: EOM are normal.   Neck: Neck supple.   Cardiovascular:  Regular rhythm.   Tachycardia present.   Exam reveals no gallop and no friction rub.        No murmur heard.  Pulmonary/Chest: Breath sounds normal. No respiratory distress.   Abdominal: Abdomen is soft. He exhibits no distension. There is no abdominal tenderness.   Musculoskeletal:      Cervical back: Neck supple.     Neurological: He is alert and oriented to person, place, and time.   Skin: Skin is warm and dry.   Psychiatric: His mood appears anxious. He is agitated and hyperactive. Thought content is not delusional. He expresses no homicidal and no suicidal ideation.         ED Course   Procedures  Labs Reviewed   CBC W/ AUTO DIFFERENTIAL - Abnormal       Result Value    WBC 3.93      RBC 4.24 (*)     Hemoglobin 13.9 (*)     Hematocrit 40.1      MCV 95      MCH 32.8 (*)     MCHC 34.7      RDW 12.8      Platelets 148 (*)     MPV 11.5      Immature Granulocytes 0.0      Gran # (ANC) 1.8      Immature Grans (Abs) 0.00      Lymph # 1.8      Mono # 0.3      Eos # 0.0      Baso # 0.04      nRBC 0      Gran % 46.1      Lymph % 44.5      Mono % 8.1      Eosinophil % 0.3      Basophil % 1.0      Differential Method Automated      Narrative:     Release to patient->Immediate   COMPREHENSIVE METABOLIC PANEL - Abnormal    Sodium 141      Potassium 3.5      Chloride 103      CO2 18 (*)     Glucose 157 (*)     BUN 12      Creatinine 0.8      Calcium 8.7      Total Protein 8.1      Albumin 4.1      Total Bilirubin 0.9      Alkaline Phosphatase 120       (*)     ALT 95 (*)     eGFR >60.0      Anion Gap 20 (*)     Narrative:     Release to patient->Immediate   HEPATITIS C ANTIBODY - Abnormal    Hepatitis C Ab Reactive (*)     Narrative:     Release to patient->Immediate   URINALYSIS, REFLEX TO URINE CULTURE - Abnormal    Specimen UA Urine, Clean Catch      Color, UA Yellow      Appearance, UA Hazy (*)     pH, UA 7.0      Specific Gravity, UA 1.030      Protein, UA 3+ (*)     Glucose, UA Negative      Ketones, UA 1+ (*)     Bilirubin (UA) 1+ (*)     Occult Blood UA 1+ (*)     Nitrite, UA Negative       Leukocytes, UA 3+ (*)     Narrative:     Specimen Source->Urine   ALCOHOL,MEDICAL (ETHANOL) - Abnormal    Alcohol, Serum 299 (*)    URINALYSIS MICROSCOPIC - Abnormal    RBC, UA 68 (*)     WBC, UA >100 (*)     Bacteria Many (*)     Hyaline Casts, UA 0      Microscopic Comment SEE COMMENT      Narrative:     Specimen Source->Urine   ALCOHOL,MEDICAL (ETHANOL) - Abnormal    Alcohol, Serum 194 (*)    ALCOHOL,MEDICAL (ETHANOL) - Abnormal    Alcohol, Serum 147 (*)    CULTURE, URINE   HIV 1 / 2 ANTIBODY    HIV 1/2 Ag/Ab Non-reactive      Narrative:     Release to patient->Immediate   DRUG SCREEN PANEL, URINE EMERGENCY    Benzodiazepines Negative      Methadone metabolites Negative      Cocaine (Metab.) Negative      Opiate Scrn, Ur Negative      Barbiturate Screen, Ur Negative      Amphetamine Screen, Ur Negative      THC Negative      Phencyclidine Negative      Creatinine, Urine 357.0      Toxicology Information SEE COMMENT      Narrative:     Specimen Source->Urine   HEPATITIS C RNA, QUANTITATIVE, PCR    Narrative:     Collection has been rescheduled by [facility] at 03/05/2025 12:22 Reason:   Nurse Draw  Collection has been rescheduled by [facility] at 03/05/2025 12:22 Reason:   Nurse Draw          Imaging Results    None          Medications   LORazepam injection 2 mg (has no administration in time range)   ondansetron disintegrating tablet 4 mg (4 mg Oral Given 3/5/25 1110)   LORazepam injection 1 mg (1 mg Intravenous Given 3/5/25 1109)   sodium chloride 0.9% bolus 1,000 mL 1,000 mL (0 mLs Intravenous Stopped 3/5/25 1219)   LORazepam injection 1 mg (1 mg Intravenous Given 3/5/25 1152)   sodium chloride 0.9% bolus 1,000 mL 1,000 mL (0 mLs Intravenous Stopped 3/5/25 1313)   nitrofurantoin (macrocrystal-monohydrate) 100 MG capsule 100 mg (100 mg Oral Given 3/5/25 1226)   LORazepam injection 2 mg (2 mg Intravenous Given 3/5/25 1449)   ondansetron disintegrating tablet 8 mg (8 mg Oral Given 3/5/25 1444)     Medical Decision Making  39  YO male with alcohol use disorder, polysubstance abuse and recent admission for suicide attempt presents for alcohol intoxication and tremors after drinking a liter of vodka this morning. Differential diagnosis includes alcohol withdrawal, illicit drug toxicity, delirium tremens. In the ED, the patient was afebrile, hypertensive at 160/110 but otherwise hemodynamically stable. Physical exam showed an agitated, disheveled man with tachycardia into the 130s but otherwise was unremarkable. CBC showed stable anemia with Hgb of 13.9. CMP with low bicarb at 18, elevated anion gap at 20, mild hyperglycemia at 157 and transaminitis. UA concerning for infection and 1+ ketones. UCx pending. UDS negative. Ethanol 299. Patient was given 2L NS, IV lorazepam and placed on CIWA precautions. Social work assisted in coordinating the patient's return back to rehab. Patient was discharged in stable condition to [facility]    Amount and/or Complexity of Data Reviewed  Labs: ordered.    Risk  Prescription drug management.                                      Clinical Impression:  Final diagnoses:  [F10.920] Alcoholic intoxication without complication (Primary)  [F10.10] ETOH abuse  [N39.0] Urinary tract infection without hematuria, site unspecified                     [1]   Social History  Tobacco Use    Smoking status: Former    Smokeless tobacco: Never   Substance Use Topics    Alcohol use: Yes    Drug use: Yes     Types: Marijuana, Amphetamines     Comment: heroine  Last use was 4/06/2020        Cecelia White MD  Resident  03/05/25 8186

## 2025-03-05 NOTE — PLAN OF CARE
[facility] - Emergency Dept  Initial Discharge Assessment       Primary Care Provider: Kaye, Primary Doctor    Admission Diagnosis: No admission diagnoses are documented for this encounter.    Admission Date: 3/5/2025  Expected Discharge Date:     Transition of Care Barriers: (P) Does not adhere to care plan, Mental illness, Unisured, Substance Abuse    Payor: /     Extended Emergency Contact Information  Primary Emergency Contact: Lorraine Lewis  Mobile Phone: [phone]  Relation: Sister    Discharge Plan A: (P) Rehab (addiction rehab)  Discharge Plan B: (P) Home with family      [id number] [place] - [address]  [address]  [place] [address]  Phone: [phone] Fax: [phone]    [id number] - [place] - [address] AT [facility] & [facility]  [address]  [facility] [address]  Phone: [phone] Fax: [phone]    [id number] [address] [facility] & [address] [address]  Phone: [phone] Fax: [phone]    [id number] [place], [place] - [address] [facility] [place] [address] [address]  [facility] [address]  Phone: [phone] Fax: [phone]      Initial Assessment (most recent)       Adult Discharge Assessment - 03/05/25 1329          Discharge Assessment    Assessment Type Discharge Planning Assessment (P)      Confirmed/corrected address, phone number and insurance Yes (P)      Confirmed Demographics Correct on Facesheet (P)      Source of Information patient;health record (P)      Communicated ALTAGRACIA with patient/caregiver Yes (P)      Do you expect to return to your current living situation? No (P)      Prior to hospitilization cognitive status: Alert/Oriented (P)      Current cognitive status: Alert/Oriented (P)    intoxicated    Equipment Currently Used at Home none (P)      Patient currently being followed by  outpatient case management? No (P)      Do you currently have service(s) that help you manage your care at home? No (P)      Do you take prescription medications? Yes (P)      Do you have prescription coverage? No (P)      Do you have any problems affording any of your prescribed medications? No (P)      Is the patient taking medications as prescribed? no (P)      How do you get to doctors appointments? family or friend will provide;public transportation (P)      Are you on dialysis? No (P)      Discharge Plan A Rehab (P)    addiction rehab    Discharge Plan B Home with family (P)      Discharge Plan discussed with: Patient (P)      Transition of Care Barriers Does not adhere to care plan;Mental illness;Unisured;Substance Abuse (P)         Financial Resource Strain    How hard is it for you to pay for the very basics like food, housing, medical care, and heating? Somewhat hard (P)         Stress    Do you feel stress - tense, restless, nervous, or anxious, or unable to sleep at night because your mind is troubled all the time - these days? To some extent (P)         Alcohol Use    Q1: How often do you have a drink containing alcohol? 4 or more times a week (P)      Q2: How many drinks containing alcohol do you have on a typical day when you are drinking? 10 or more (P)      Q3: How often do you have six or more drinks on one occasion? Daily or almost daily (P)

## 2025-03-05 NOTE — PLAN OF CARE
Discharge Planning Assessment:  Patient admitted on: 3-5-25  Chart reviewed today  Care plan discussed with ER treatment team, attending Dr Schilling  Current Dispo: on going  Self reports that he wants to return to [facility] when medically cleared  Spoke with admissions today at [facility], will fax clinicals  for review and acceptance     abnormal data Ethanol  Order: [id number]   Status: Final result       Next appt: None    Test Result Released: Yes (not seen)    0 Result Notes        Component  Ref Range & Units (hover) 11:29     Alcohol, Serum 299 High      Resulting Agency [facility]                  Current User    Alcohol use No    Drug use: No    Sexual activity: Yes     Partners: Female     Other Topics Concern    Not on file     Social History Narrative    No narrative on file       No family history on file. REVIEW OF SYSTEMS:    CONSTITUTIONAL:  negative for  fevers, chills, sweats and fatigue    RESPIRATORY:  negative for  dry cough, cough with sputum, dyspnea, wheezing and chest pain    CARDIOVASCULAR:  negative for chest pain, dyspnea, palpitations, syncope    GASTROINTESTINAL:  negative for nausea, vomiting, change in bowel habits, diarrhea, constipation and abdominal pain    MUSCULOSKELETAL: negative for muscle weakness    SKIN: negative for itching or rashes. BEHAVIOR/PSYCH:  negative for poor appetite, increased appetite, decreased sleep and poor concentration    All other systems negative      PHYSICAL EXAM:    VITALS:  /66   Pulse 65   Resp 18   SpO2 97%     CONSTITUTIONAL:  awake, alert, cooperative, no apparent distress, and appears stated age    EYES: PERRLA, EOMI    LUNGS:  No increased work of breathing, no audible wheezing    CARDIOVASCULAR:  regular rate and rhythm    ABDOMEN:  Soft non tender non distended     EXTREMITIES: no signs of clubbing or cyanosis. MUSCULOSKELETAL: negative for flaccid muscle tone or spastic movements. SKIN: gross examination reveals no signs of rashes, or diaphoresis. NEURO: Cranial nerves II-XII grossly intact. No signs of agitated mood.        Assessment/Plan:    Axial neck pain for Left C3,4,5 MBB

## 2025-03-06 ENCOUNTER — RESULTS FOLLOW-UP (OUTPATIENT)
Dept: EMERGENCY MEDICINE | Facility: HOSPITAL | Age: 40
End: 2025-03-06

## 2025-03-06 LAB
HCV RNA SERPL NAA+PROBE-LOG IU: 4.98 LOGIU/ML
HCV RNA SERPL QL NAA+PROBE: DETECTED
HCV RNA SPEC NAA+PROBE-ACNC: ABNORMAL IU/ML

## 2025-03-06 NOTE — ED NOTES
Pt given a printed copy of his AVS, and provided a Lyft ride to [facility]. Pt given time to review the AVS and ask questions. Pt ambulated off of the unit with a strong steady gait.

## 2025-03-07 LAB — BACTERIA UR CULT: ABNORMAL

## 2025-03-27 ENCOUNTER — RESULTS FOLLOW-UP (OUTPATIENT)
Dept: EMERGENCY MEDICINE | Facility: HOSPITAL | Age: 40
End: 2025-03-27

## 2025-03-27 DIAGNOSIS — B19.20 HEPATITIS C TEST POSITIVE: Primary | ICD-10-CM

## 2025-04-10 ENCOUNTER — TELEPHONE (OUTPATIENT)
Dept: HEPATOLOGY | Facility: CLINIC | Age: 40
End: 2025-04-10
Payer: COMMERCIAL

## 2025-04-10 NOTE — TELEPHONE ENCOUNTER
Chino Koroma PA-C ordered that patient be scheduled for a hepatology consult visit for hep c. Attempt made to schedule patient for a visit with PA Scheuermann at [facility].  The patient does not have insurance coverage. The patient will have to pay out of pocket for this visit so consult visit not scheduled.  Attempt made to reach patient; unable to leave a voicemail.  Referral info will be faxed to Sharon Benitez at [facility] so that patient can be scheduled there for a visit. This message is being sent to the referring provider.